# Patient Record
Sex: MALE | HISPANIC OR LATINO | Employment: UNEMPLOYED | URBAN - METROPOLITAN AREA
[De-identification: names, ages, dates, MRNs, and addresses within clinical notes are randomized per-mention and may not be internally consistent; named-entity substitution may affect disease eponyms.]

---

## 2022-04-06 ENCOUNTER — TRANSCRIBE ORDERS (OUTPATIENT)
Dept: CARDIOLOGY | Facility: CLINIC | Age: 47
End: 2022-04-06

## 2022-04-06 ENCOUNTER — TRANSCRIBE ORDERS (OUTPATIENT)
Dept: ADMINISTRATIVE | Facility: HOSPITAL | Age: 47
End: 2022-04-06

## 2022-04-06 DIAGNOSIS — I31.39 PERICARDIAL EFFUSION (NONINFLAMMATORY): Primary | ICD-10-CM

## 2022-04-06 DIAGNOSIS — Z01.810 PRE-OPERATIVE CARDIOVASCULAR EXAMINATION: Primary | ICD-10-CM

## 2022-04-06 DIAGNOSIS — Z13.6 SCREENING FOR ISCHEMIC HEART DISEASE: ICD-10-CM

## 2022-04-06 DIAGNOSIS — Z01.818 OTHER SPECIFIED PRE-OPERATIVE EXAMINATION: Primary | ICD-10-CM

## 2022-04-08 ENCOUNTER — LAB (OUTPATIENT)
Dept: LAB | Facility: HOSPITAL | Age: 47
End: 2022-04-08

## 2022-04-08 DIAGNOSIS — Z01.818 OTHER SPECIFIED PRE-OPERATIVE EXAMINATION: ICD-10-CM

## 2022-04-08 DIAGNOSIS — Z13.6 SCREENING FOR ISCHEMIC HEART DISEASE: ICD-10-CM

## 2022-04-08 DIAGNOSIS — Z01.810 PRE-OPERATIVE CARDIOVASCULAR EXAMINATION: ICD-10-CM

## 2022-04-08 LAB — SARS-COV-2 ORF1AB RESP QL NAA+PROBE: NOT DETECTED

## 2022-04-08 PROCEDURE — 36415 COLL VENOUS BLD VENIPUNCTURE: CPT

## 2022-04-08 PROCEDURE — C9803 HOPD COVID-19 SPEC COLLECT: HCPCS

## 2022-04-08 PROCEDURE — U0004 COV-19 TEST NON-CDC HGH THRU: HCPCS

## 2022-04-08 PROCEDURE — 82172 ASSAY OF APOLIPOPROTEIN: CPT

## 2022-04-10 LAB — APO B SERPL-MCNC: 102 MG/DL

## 2022-04-11 ENCOUNTER — HOSPITAL ENCOUNTER (OUTPATIENT)
Facility: HOSPITAL | Age: 47
Setting detail: HOSPITAL OUTPATIENT SURGERY
Discharge: HOME | End: 2022-04-11
Attending: INTERNAL MEDICINE | Admitting: INTERNAL MEDICINE

## 2022-04-11 ENCOUNTER — APPOINTMENT (OUTPATIENT)
Dept: CARDIOLOGY | Facility: HOSPITAL | Age: 47
End: 2022-04-11

## 2022-04-11 VITALS
BODY MASS INDEX: 32.2 KG/M2 | HEIGHT: 62 IN | OXYGEN SATURATION: 97 % | TEMPERATURE: 99.1 F | SYSTOLIC BLOOD PRESSURE: 137 MMHG | WEIGHT: 175 LBS | RESPIRATION RATE: 16 BRPM | HEART RATE: 76 BPM | DIASTOLIC BLOOD PRESSURE: 84 MMHG

## 2022-04-11 DIAGNOSIS — I31.39 PERICARDIAL EFFUSION (NONINFLAMMATORY): ICD-10-CM

## 2022-04-11 LAB
MAXIMAL PREDICTED HEART RATE: 172 BPM
STRESS TARGET HR: 146 BPM

## 2022-04-11 PROCEDURE — 93321 DOPPLER ECHO F-UP/LMTD STD: CPT

## 2022-04-11 PROCEDURE — S0260 H&P FOR SURGERY: HCPCS | Performed by: INTERNAL MEDICINE

## 2022-04-11 PROCEDURE — 93308 TTE F-UP OR LMTD: CPT

## 2022-04-11 PROCEDURE — 93308 TTE F-UP OR LMTD: CPT | Performed by: INTERNAL MEDICINE

## 2022-04-11 PROCEDURE — 93321 DOPPLER ECHO F-UP/LMTD STD: CPT | Performed by: INTERNAL MEDICINE

## 2022-04-11 RX ORDER — SODIUM CHLORIDE 0.9 % (FLUSH) 0.9 %
10 SYRINGE (ML) INJECTION EVERY 12 HOURS SCHEDULED
Status: DISCONTINUED | OUTPATIENT
Start: 2022-04-11 | End: 2022-04-11 | Stop reason: HOSPADM

## 2022-04-11 RX ORDER — AMLODIPINE BESYLATE 10 MG/1
10 TABLET ORAL DAILY
COMMUNITY
End: 2022-06-05 | Stop reason: HOSPADM

## 2022-04-11 RX ORDER — LOSARTAN POTASSIUM 100 MG/1
100 TABLET ORAL DAILY
COMMUNITY
End: 2022-06-05 | Stop reason: HOSPADM

## 2022-04-11 RX ORDER — SIMVASTATIN 40 MG
40 TABLET ORAL NIGHTLY
COMMUNITY

## 2022-04-11 RX ORDER — SODIUM CHLORIDE 0.9 % (FLUSH) 0.9 %
10 SYRINGE (ML) INJECTION AS NEEDED
Status: DISCONTINUED | OUTPATIENT
Start: 2022-04-11 | End: 2022-04-11 | Stop reason: HOSPADM

## 2022-04-11 RX ORDER — SODIUM CHLORIDE 9 MG/ML
75 INJECTION, SOLUTION INTRAVENOUS CONTINUOUS
Status: DISCONTINUED | OUTPATIENT
Start: 2022-04-11 | End: 2022-04-11 | Stop reason: HOSPADM

## 2022-04-11 RX ADMIN — SODIUM CHLORIDE 75 ML/HR: 9 INJECTION, SOLUTION INTRAVENOUS at 11:40

## 2022-04-11 NOTE — NURSING NOTE
Procedure cancelled d/t echo results. Pericardiocentesis not needed at this time. Pt and son informed. IV d/c and pt discharged home.

## 2022-04-11 NOTE — H&P
He was referred for pericardiocentesis we did a limited echo today and really his effusion is small I reviewed it with Dr. Cedillo was given a hold off on a pericardiocentesis and he will follow up with Dr. Darnell have a heart

## 2022-04-26 ENCOUNTER — OFFICE VISIT (OUTPATIENT)
Dept: CARDIAC SURGERY | Facility: CLINIC | Age: 47
End: 2022-04-26

## 2022-04-26 VITALS
TEMPERATURE: 98.7 F | OXYGEN SATURATION: 98 % | BODY MASS INDEX: 32.02 KG/M2 | WEIGHT: 174 LBS | HEART RATE: 76 BPM | SYSTOLIC BLOOD PRESSURE: 138 MMHG | HEIGHT: 62 IN | DIASTOLIC BLOOD PRESSURE: 77 MMHG

## 2022-04-26 DIAGNOSIS — Z86.11 HISTORY OF TUBERCULOSIS: ICD-10-CM

## 2022-04-26 DIAGNOSIS — K08.89 TOOTH PAIN: ICD-10-CM

## 2022-04-26 DIAGNOSIS — I31.39 PERICARDIAL EFFUSION (NONINFLAMMATORY): ICD-10-CM

## 2022-04-26 DIAGNOSIS — I35.0 SEVERE AORTIC VALVE STENOSIS: ICD-10-CM

## 2022-04-26 DIAGNOSIS — K05.10 GUM INFLAMMATION: Primary | ICD-10-CM

## 2022-04-26 PROCEDURE — 99204 OFFICE O/P NEW MOD 45 MIN: CPT | Performed by: THORACIC SURGERY (CARDIOTHORACIC VASCULAR SURGERY)

## 2022-04-26 RX ORDER — AMOXICILLIN 500 MG/1
1000 CAPSULE ORAL 3 TIMES DAILY
Qty: 30 CAPSULE | Refills: 0 | Status: ON HOLD | OUTPATIENT
Start: 2022-04-26 | End: 2022-05-30

## 2022-04-27 PROBLEM — K08.89 TOOTH PAIN: Status: ACTIVE | Noted: 2022-04-27

## 2022-04-27 PROBLEM — I35.0 SEVERE AORTIC VALVE STENOSIS: Status: ACTIVE | Noted: 2022-04-27

## 2022-04-27 PROBLEM — Z86.11 HISTORY OF TUBERCULOSIS: Status: ACTIVE | Noted: 2022-04-27

## 2022-04-27 NOTE — PROGRESS NOTES
4/27/2022    Seen on 4/26/2022    Chief Complaint     Evaluation of aortic stenosis    History of Present Illness:       Dear Refugio Jackson MD and Colleagues,  It was nice to see Jorge Luis Shamika in consultation at your request. He is a 46 y.o. male with a history of tuberculosis in the distant past for what he was treated for 1 year with 2 agents, hypercholesterolemia, hypertension who was seen in follow-up for his aortic stenosis which was moderate in the past.  He is asymptomatic.  Denies chest pain, dyspnea orthopnea.  He works as a .  He is able to develop his usual activity.  He denies any cough, hemoptysis night sweats or inspiratory chest pain.  He had a 2D echo cardiogram that showed a degree of pericardial effusion and given his previous history he was scheduled for the pericardiocardiocentesis.  His aortic stenosis was labeled as severe which is progressed from before.  He denies scarlet or rheumatic fever in the past.  Denies IV drug use.  He had a repeat echocardiogram before the pericardiocentesis and there was no much fluid so the procedure was not performed.  His echocardiogram showed a peak velocity of 4.2 m/s, peak gradient 73 mmHg, mean gradient of 44 mmHg and an aortic valve area 0.8 cm² and ejection fraction was normal.  The aortic valve was bicuspid and had mild regurgitation.  He is here for a surgical opinion    Patient Active Problem List   Diagnosis   • Pericardial effusion (noninflammatory)   • Tooth pain   • Severe aortic valve stenosis   • History of tuberculosis       Past Medical History:   Diagnosis Date   • Cardiac murmur    • Hypercholesterolemia    • Hypertension    • Pericardial effusion    • Topical med dermatitis        Past Surgical History:   Procedure Laterality Date   • SHOULDER CLOSED REDUCTION Right        Allergies   Allergen Reactions   • Lisinopril Cough         Current Outpatient Medications:   •  amLODIPine (NORVASC) 10 MG tablet, Take 10 mg by  mouth Daily., Disp: , Rfl:   •  LOSARTAN POTASSIUM PO, Take 100 mg by mouth Daily., Disp: , Rfl:   •  simvastatin (ZOCOR) 40 MG tablet, Take 40 mg by mouth Every Night., Disp: , Rfl:   •  amoxicillin (AMOXIL) 500 MG capsule, Take 2 capsules by mouth 3 (Three) Times a Day., Disp: 30 capsule, Rfl: 0    Social History     Socioeconomic History   • Marital status:    Tobacco Use   • Smoking status: Never Smoker   • Smokeless tobacco: Never Used   Vaping Use   • Vaping Use: Never used   Substance and Sexual Activity   • Alcohol use: Yes     Comment: WEEKENDS   • Drug use: Never   • Sexual activity: Defer       History reviewed. No pertinent family history.    Review of Systems  Noncontributory    Physical Exam:    Vital Signs:  Weight: 78.9 kg (174 lb)   Body mass index is 31.83 kg/m².  Temp: 98.7 °F (37.1 °C)   Heart Rate: 76   BP: 138/77     Constitutional:       Appearance: Well-developed.   Eyes:      Conjunctiva/sclera: Conjunctivae normal.      Pupils: Pupils are equal, round, and reactive to light.   HENT:      Head: Normocephalic and atraumatic.      Nose: Nose normal.   Neck:      Thyroid: No thyromegaly.      Vascular: No JVD.      Lymphadenopathy: No cervical adenopathy.   Pulmonary:      Effort: Pulmonary effort is normal.      Breath sounds: Normal breath sounds. No rales.   Cardiovascular:      PMI at left midclavicular line. Normal rate. Regular rhythm.      Murmurs: There is a harsh midsystolic murmur at the URSB, radiating to the neck.      No gallop.   Pulses:     Intact distal pulses. No decreased pulses.   Edema:     Peripheral edema absent.   Abdominal:      General: Bowel sounds are normal. There is no distension.      Palpations: Abdomen is soft. There is no abdominal mass.      Tenderness: There is no abdominal tenderness.   Musculoskeletal: Normal range of motion.         General: No tenderness or deformity.      Cervical back: Normal range of motion and neck supple. Skin:     General:  Skin is warm and dry.      Findings: No erythema or rash.   Neurological:      Mental Status: Alert and oriented to person, place, and time.      Deep Tendon Reflexes: Reflexes are normal and symmetric.   Psychiatric:         Behavior: Behavior normal.          Assessment:     Problems Addressed this Visit        Cardiac and Vasculature    Pericardial effusion (noninflammatory)    Severe aortic valve stenosis       ENT    Tooth pain       Other    History of tuberculosis      Other Visit Diagnoses     Gum inflammation    -  Primary      Diagnoses       Codes Comments    Gum inflammation    -  Primary ICD-10-CM: K05.10  ICD-9-CM: 523.10     Pericardial effusion (noninflammatory)     ICD-10-CM: I31.3  ICD-9-CM: 423.9     Severe aortic valve stenosis     ICD-10-CM: I35.0  ICD-9-CM: 424.1     Tooth pain     ICD-10-CM: K08.89  ICD-9-CM: 525.9     History of tuberculosis     ICD-10-CM: Z86.11  ICD-9-CM: V12.01             Assessment/recommendation:     1. Severe aortic stenosis without symptoms at the time.  Seemingly he has no aortic root enlargement per echocardiogram.  His gradients have been progressive.  I recommend 6-month follow-up with another echocardiogram and most likely at some point soon he will require aortic valve replacement, either a biologic prosthesis versus a Ross procedure.  We will see him in 6 months and follow-up  2. He has documented pericardial effusion but there is no clear whether that is also inflammation changes or previous pericarditis.  He has a history of tuberculosis and is possible that there is some chronic pericarditis.  There is no evidence of heart restriction at this point.  I recommend a chest CT to better evaluate the presence of a peel.  3. Hypertension, well controlled.  Continue same regimen    Thank you for allowing me to participate in his care.    Regards,    David Fallon M.D.  I spent over 45 minutes in reviewing the records and examining the patient, reviewing and  interpreting the echocardiograms myself and discussing the findings and options with the patient, delineating a plan of work-up of chronic pericarditis with the indication of the chest CT scan, discussing the plan of an operation in the near future, discussing the case with the care team and documenting in the electronic record

## 2022-05-09 ENCOUNTER — TELEPHONE (OUTPATIENT)
Dept: CARDIAC SURGERY | Facility: CLINIC | Age: 47
End: 2022-05-09

## 2022-05-09 NOTE — TELEPHONE ENCOUNTER
Patient family called to update the provider on the fact that the patient is getting his teeth pulled on Thursday .     The more concern they have is that they know in the works trying to get the patient in for a open heart surgery . The patient stands to lose his medicaid at the end of the month . They were wondering if there was anything the provider can do to help get a additional month of medicaid    provider is David Fallon MD    Best phone number is to reach the wife   [phone]

## 2022-05-10 DIAGNOSIS — I35.0 AORTIC STENOSIS, SEVERE: Primary | ICD-10-CM

## 2022-05-16 ENCOUNTER — TRANSCRIBE ORDERS (OUTPATIENT)
Dept: CARDIOLOGY | Facility: CLINIC | Age: 47
End: 2022-05-16

## 2022-05-16 DIAGNOSIS — Z01.818 OTHER SPECIFIED PRE-OPERATIVE EXAMINATION: ICD-10-CM

## 2022-05-16 DIAGNOSIS — Z01.810 PREPROCEDURAL CARDIOVASCULAR EXAMINATION: ICD-10-CM

## 2022-05-16 DIAGNOSIS — Z13.6 SCREENING FOR CARDIOVASCULAR CONDITION: Primary | ICD-10-CM

## 2022-05-17 ENCOUNTER — TELEPHONE (OUTPATIENT)
Dept: CARDIOLOGY | Facility: CLINIC | Age: 47
End: 2022-05-17

## 2022-05-17 ENCOUNTER — LAB (OUTPATIENT)
Dept: LAB | Facility: HOSPITAL | Age: 47
End: 2022-05-17

## 2022-05-17 ENCOUNTER — APPOINTMENT (OUTPATIENT)
Dept: LAB | Facility: HOSPITAL | Age: 47
End: 2022-05-17

## 2022-05-17 DIAGNOSIS — Z01.810 PREPROCEDURAL CARDIOVASCULAR EXAMINATION: ICD-10-CM

## 2022-05-17 DIAGNOSIS — Z01.818 OTHER SPECIFIED PRE-OPERATIVE EXAMINATION: ICD-10-CM

## 2022-05-17 DIAGNOSIS — Z13.6 SCREENING FOR CARDIOVASCULAR CONDITION: ICD-10-CM

## 2022-05-17 LAB
ANION GAP SERPL CALCULATED.3IONS-SCNC: 12.3 MMOL/L (ref 5–15)
BASOPHILS # BLD AUTO: 0.06 10*3/MM3 (ref 0–0.2)
BASOPHILS NFR BLD AUTO: 0.8 % (ref 0–1.5)
BUN SERPL-MCNC: 10 MG/DL (ref 6–20)
BUN/CREAT SERPL: 12.3 (ref 7–25)
CALCIUM SPEC-SCNC: 9.7 MG/DL (ref 8.6–10.5)
CHLORIDE SERPL-SCNC: 103 MMOL/L (ref 98–107)
CO2 SERPL-SCNC: 23.7 MMOL/L (ref 22–29)
CREAT SERPL-MCNC: 0.81 MG/DL (ref 0.76–1.27)
DEPRECATED RDW RBC AUTO: 40.9 FL (ref 37–54)
EGFRCR SERPLBLD CKD-EPI 2021: 108.8 ML/MIN/1.73
EOSINOPHIL # BLD AUTO: 0.17 10*3/MM3 (ref 0–0.4)
EOSINOPHIL NFR BLD AUTO: 2.4 % (ref 0.3–6.2)
ERYTHROCYTE [DISTWIDTH] IN BLOOD BY AUTOMATED COUNT: 12.1 % (ref 12.3–15.4)
GLUCOSE SERPL-MCNC: 100 MG/DL (ref 65–99)
HCT VFR BLD AUTO: 43.5 % (ref 37.5–51)
HGB BLD-MCNC: 15.1 G/DL (ref 13–17.7)
IMM GRANULOCYTES # BLD AUTO: 0.02 10*3/MM3 (ref 0–0.05)
IMM GRANULOCYTES NFR BLD AUTO: 0.3 % (ref 0–0.5)
LYMPHOCYTES # BLD AUTO: 1.3 10*3/MM3 (ref 0.7–3.1)
LYMPHOCYTES NFR BLD AUTO: 18.4 % (ref 19.6–45.3)
MCH RBC QN AUTO: 32 PG (ref 26.6–33)
MCHC RBC AUTO-ENTMCNC: 34.7 G/DL (ref 31.5–35.7)
MCV RBC AUTO: 92.2 FL (ref 79–97)
MONOCYTES # BLD AUTO: 0.34 10*3/MM3 (ref 0.1–0.9)
MONOCYTES NFR BLD AUTO: 4.8 % (ref 5–12)
NEUTROPHILS NFR BLD AUTO: 5.18 10*3/MM3 (ref 1.7–7)
NEUTROPHILS NFR BLD AUTO: 73.3 % (ref 42.7–76)
NRBC BLD AUTO-RTO: 0 /100 WBC (ref 0–0.2)
PLATELET # BLD AUTO: 342 10*3/MM3 (ref 140–450)
PMV BLD AUTO: 9.5 FL (ref 6–12)
POTASSIUM SERPL-SCNC: 4.6 MMOL/L (ref 3.5–5.2)
RBC # BLD AUTO: 4.72 10*6/MM3 (ref 4.14–5.8)
SARS-COV-2 RNA RESP QL NAA+PROBE: DETECTED
SODIUM SERPL-SCNC: 139 MMOL/L (ref 136–145)
WBC NRBC COR # BLD: 7.07 10*3/MM3 (ref 3.4–10.8)

## 2022-05-17 PROCEDURE — 80048 BASIC METABOLIC PNL TOTAL CA: CPT

## 2022-05-17 PROCEDURE — 85025 COMPLETE CBC W/AUTO DIFF WBC: CPT

## 2022-05-17 PROCEDURE — C9803 HOPD COVID-19 SPEC COLLECT: HCPCS

## 2022-05-17 PROCEDURE — U0003 INFECTIOUS AGENT DETECTION BY NUCLEIC ACID (DNA OR RNA); SEVERE ACUTE RESPIRATORY SYNDROME CORONAVIRUS 2 (SARS-COV-2) (CORONAVIRUS DISEASE [COVID-19]), AMPLIFIED PROBE TECHNIQUE, MAKING USE OF HIGH THROUGHPUT TECHNOLOGIES AS DESCRIBED BY CMS-2020-01-R: HCPCS

## 2022-05-17 PROCEDURE — 36415 COLL VENOUS BLD VENIPUNCTURE: CPT

## 2022-05-17 NOTE — TELEPHONE ENCOUNTER
Scheduling,    Pt has tested positive for COVID 19. Per Dr. Corcoran his cath procedure needs to be rescheduled for after his quarantine period. I called the patient with an  and he left a detailed message on the patient's voicemail letting him know the situation. Could you get his procedure rescheduled?    Thank you,    Mary Fraser RN  Triage [facility]

## 2022-05-17 NOTE — TELEPHONE ENCOUNTER
Dr. Corcoran,    Lab called to report pt is positive for COVID. Do I need to let him know or work on changing his cath date?    Thank you,    Mary Fraser, RN  Triage [facility]

## 2022-05-18 ENCOUNTER — TELEPHONE (OUTPATIENT)
Dept: CARDIAC SURGERY | Facility: CLINIC | Age: 47
End: 2022-05-18

## 2022-05-18 NOTE — TELEPHONE ENCOUNTER
Spoke to pt about outpatient covid  Test from 5/17. Negative result scanned in to chart. Message sent to Dr. Corcoran per Vasyl Seay's request to inquire about next steps for heart cath.

## 2022-05-19 ENCOUNTER — HOSPITAL ENCOUNTER (OUTPATIENT)
Facility: HOSPITAL | Age: 47
Setting detail: HOSPITAL OUTPATIENT SURGERY
Discharge: HOME | End: 2022-05-19
Attending: INTERNAL MEDICINE | Admitting: INTERNAL MEDICINE

## 2022-05-19 ENCOUNTER — APPOINTMENT (OUTPATIENT)
Dept: CT IMAGING | Facility: HOSPITAL | Age: 47
End: 2022-05-19

## 2022-05-19 VITALS
SYSTOLIC BLOOD PRESSURE: 122 MMHG | RESPIRATION RATE: 16 BRPM | TEMPERATURE: 98.7 F | OXYGEN SATURATION: 97 % | HEIGHT: 60 IN | DIASTOLIC BLOOD PRESSURE: 70 MMHG | WEIGHT: 168 LBS | HEART RATE: 68 BPM | BODY MASS INDEX: 32.98 KG/M2

## 2022-05-19 DIAGNOSIS — I35.0 AORTIC STENOSIS, SEVERE: ICD-10-CM

## 2022-05-19 PROCEDURE — C1769 GUIDE WIRE: HCPCS | Performed by: INTERNAL MEDICINE

## 2022-05-19 PROCEDURE — C1894 INTRO/SHEATH, NON-LASER: HCPCS | Performed by: INTERNAL MEDICINE

## 2022-05-19 PROCEDURE — 93454 CORONARY ARTERY ANGIO S&I: CPT | Performed by: INTERNAL MEDICINE

## 2022-05-19 PROCEDURE — 25010000002 MIDAZOLAM PER 1 MG: Performed by: INTERNAL MEDICINE

## 2022-05-19 PROCEDURE — 25010000002 HEPARIN (PORCINE) PER 1000 UNITS: Performed by: INTERNAL MEDICINE

## 2022-05-19 PROCEDURE — 0 IOPAMIDOL PER 1 ML: Performed by: INTERNAL MEDICINE

## 2022-05-19 PROCEDURE — 71250 CT THORAX DX C-: CPT

## 2022-05-19 PROCEDURE — 25010000002 FENTANYL CITRATE (PF) 50 MCG/ML SOLUTION: Performed by: INTERNAL MEDICINE

## 2022-05-19 RX ORDER — ACETAMINOPHEN 325 MG/1
650 TABLET ORAL EVERY 4 HOURS PRN
Status: DISCONTINUED | OUTPATIENT
Start: 2022-05-19 | End: 2022-05-19 | Stop reason: HOSPADM

## 2022-05-19 RX ORDER — FENTANYL CITRATE 50 UG/ML
INJECTION, SOLUTION INTRAMUSCULAR; INTRAVENOUS AS NEEDED
Status: DISCONTINUED | OUTPATIENT
Start: 2022-05-19 | End: 2022-05-19 | Stop reason: HOSPADM

## 2022-05-19 RX ORDER — SODIUM CHLORIDE 0.9 % (FLUSH) 0.9 %
10 SYRINGE (ML) INJECTION AS NEEDED
Status: DISCONTINUED | OUTPATIENT
Start: 2022-05-19 | End: 2022-05-19 | Stop reason: HOSPADM

## 2022-05-19 RX ORDER — LIDOCAINE HYDROCHLORIDE 20 MG/ML
INJECTION, SOLUTION INFILTRATION; PERINEURAL AS NEEDED
Status: DISCONTINUED | OUTPATIENT
Start: 2022-05-19 | End: 2022-05-19 | Stop reason: HOSPADM

## 2022-05-19 RX ORDER — SODIUM CHLORIDE 9 MG/ML
75 INJECTION, SOLUTION INTRAVENOUS CONTINUOUS
Status: DISCONTINUED | OUTPATIENT
Start: 2022-05-19 | End: 2022-05-19 | Stop reason: HOSPADM

## 2022-05-19 RX ORDER — SODIUM CHLORIDE 0.9 % (FLUSH) 0.9 %
10 SYRINGE (ML) INJECTION EVERY 12 HOURS SCHEDULED
Status: DISCONTINUED | OUTPATIENT
Start: 2022-05-19 | End: 2022-05-19 | Stop reason: HOSPADM

## 2022-05-19 RX ORDER — MIDAZOLAM HYDROCHLORIDE 1 MG/ML
INJECTION INTRAMUSCULAR; INTRAVENOUS AS NEEDED
Status: DISCONTINUED | OUTPATIENT
Start: 2022-05-19 | End: 2022-05-19 | Stop reason: HOSPADM

## 2022-05-19 RX ADMIN — SODIUM CHLORIDE 75 ML/HR: 9 INJECTION, SOLUTION INTRAVENOUS at 11:38

## 2022-05-19 NOTE — DISCHARGE INSTRUCTIONS
[facility]  [address] [place] [address]    Coronary Angiogram (Radial/Ulnar Approach) After Care    Refer to this sheet in the next few weeks. These instructions provide you with information on caring for yourself after your procedure. Your caregiver may also give you more specific instructions. Your treatment has been planned according to current medical practices, but problems sometimes occur. Call your caregiver if you have any problems or questions after your procedure.    Home Care Instructions:  You may shower the day after the procedure. Remove the bandage (dressing) and gently wash the site with plain soap and water. Gently pat the site dry. You may apply a band aid daily for 2 days if desired.    Do not apply powder or lotion to the site.  Do not submerge the affected site in water for 3 to 5 days or until the site is completely healed.   Do not lift, push or pull anything over 5 pounds for 5 days after your procedure or as directed by your physician.  As a reference, a gallon of milk weighs 8 pounds.   Inspect the site at least twice daily. You may notice some bruising at the site and it may be tender for 1 to 2 weeks.     Increase your fluid intake for the next 2 days.    Keep arm elevated for 24 hours. For the remainder of the day, keep your arm in “Pledge of Allegiance” position when up and about.     You may drive 24 hours after the procedure unless otherwise instructed by your caregiver.  Do not operate machinery or power tools for 24 hours.  A responsible adult should be with you for the first 24 hours after you arrive home. Do not make any important legal decisions or sign legal papers for 24 hours.  Do not drink alcohol for 24 hours.    Metformin or any medications containing Metformin should not be taken for 48 hours after your procedure.      Call Your Doctor if:   You have unusual pain at the radial/ulnar (wrist) site.  You have redness, warmth, swelling, or pain at the  radial/ulnar (wrist) site.  You have drainage (other than a small amount of blood on the dressing).  `You have chills or a fever > 101.  Your arm becomes pale or dark, cool, tingly, or numb.  You develop chest pain, shortness of breath, feel faint or pass out.    You have heavy bleeding from the site, hold pressure on the site for 20 minutes.  If the bleeding stops, apply a fresh bandage and call your cardiologist.  However, if you        continue to have bleeding, call 911 and continue to apply pressure to the site.   You have any symptoms of a stroke.  Remember BE FAST  B-balance. Sudden trouble walking or loss of balance.  E-eyes.  Sudden changes in how you see or a sudden onset of a very bad headache.   F-face. Sudden weakness or loss of feeling of the face or facial droop on one side.   A-arms Sudden weakness or numbness in one arm.  One arm drifts down if they are both held out in front of you. This happens suddenly and usually on one side of the body.   S-speech.  Sudden trouble speaking, slurred speech or trouble understanding what are saying.   T-time  Time to call emergency services.  Write down the symptoms and the time they started.

## 2022-05-20 ENCOUNTER — PREP FOR SURGERY (OUTPATIENT)
Dept: OTHER | Facility: HOSPITAL | Age: 47
End: 2022-05-20

## 2022-05-20 ENCOUNTER — HOSPITAL ENCOUNTER (OUTPATIENT)
Facility: HOSPITAL | Age: 47
Setting detail: SURGERY ADMIT
End: 2022-05-20

## 2022-05-20 ENCOUNTER — TELEPHONE (OUTPATIENT)
Dept: CARDIAC SURGERY | Facility: CLINIC | Age: 47
End: 2022-05-20

## 2022-05-20 DIAGNOSIS — I35.0 AORTIC STENOSIS, SEVERE: Primary | ICD-10-CM

## 2022-05-20 RX ORDER — CHLORHEXIDINE GLUCONATE 0.12 MG/ML
15 RINSE ORAL ONCE
Status: CANCELLED | OUTPATIENT
Start: 2022-05-20 | End: 2022-05-20

## 2022-05-20 RX ORDER — CHLORHEXIDINE GLUCONATE 0.12 MG/ML
15 RINSE ORAL EVERY 12 HOURS
Status: CANCELLED | OUTPATIENT
Start: 2022-05-20 | End: 2022-05-21

## 2022-05-20 RX ORDER — CHLORHEXIDINE GLUCONATE 500 MG/1
1 CLOTH TOPICAL EVERY 12 HOURS PRN
Status: CANCELLED | OUTPATIENT
Start: 2022-05-20

## 2022-05-20 RX ORDER — CEFAZOLIN SODIUM 2 G/100ML
2 INJECTION, SOLUTION INTRAVENOUS
Status: CANCELLED | OUTPATIENT
Start: 2022-05-21 | End: 2022-05-22

## 2022-05-20 NOTE — TELEPHONE ENCOUNTER
Called to speak to patient to give him this PAT and surgery times.  Diana spoke to them and explained his PAT is on 5- at 0700. Surgery is 5-31-+2022 at 0730 with an 0500 arrival time.  They expressed a verbal understainding to her of this information.

## 2022-05-25 ENCOUNTER — TELEPHONE (OUTPATIENT)
Dept: CARDIAC SURGERY | Facility: CLINIC | Age: 47
End: 2022-05-25

## 2022-05-25 NOTE — TELEPHONE ENCOUNTER
Caller: Jorge Luis Godinez    Relationship: SON    Best call back number: [phone]    What is the best time to reach you: ANY    Who are you requesting to speak with (clinical staff, provider,  specific staff member): MARIA G FISH    What was the call regarding: CALLING TO SEE IF SHE HAD RECEIVED [facility] PAPERWORK- PT SON (SAME NAME AS PT) STATED HE RECEIVED AN ERROR WHEN HE TRIED TO EMAIL MARIA G.     I DID ALSO PROVIDE PT SON WITH OFFICE FAX NUMBER.     Do you require a callback: YES

## 2022-05-27 ENCOUNTER — APPOINTMENT (OUTPATIENT)
Dept: CARDIOLOGY | Facility: HOSPITAL | Age: 47
End: 2022-05-27

## 2022-05-27 ENCOUNTER — APPOINTMENT (OUTPATIENT)
Dept: PREADMISSION TESTING | Facility: HOSPITAL | Age: 47
End: 2022-05-27

## 2022-05-27 ENCOUNTER — TELEPHONE (OUTPATIENT)
Dept: CARDIAC SURGERY | Facility: CLINIC | Age: 47
End: 2022-05-27

## 2022-05-27 NOTE — TELEPHONE ENCOUNTER
Spoke to son with new plans for his fathers admission and surgery. Patient will be admitted on 5-. I have spoken to Raven at bed board and explained situation with insurance issues and possibility of using [facility]. Son is to sign paper DSH form with Tomás hoffman at [facility]. Surgery is scheduled for 5- with Dr. Greenberg. This has all been cleared and approved with Smiley and Mariajose. The son expressed a verbal understanding of all of this.  I gave him [phone] to call on Monday if he had not heard anything from them by around 0900.

## 2022-05-30 ENCOUNTER — ANESTHESIA EVENT (OUTPATIENT)
Dept: PERIOP | Facility: HOSPITAL | Age: 47
End: 2022-05-30

## 2022-05-30 ENCOUNTER — APPOINTMENT (OUTPATIENT)
Dept: GENERAL RADIOLOGY | Facility: HOSPITAL | Age: 47
End: 2022-05-30

## 2022-05-30 ENCOUNTER — PREP FOR SURGERY (OUTPATIENT)
Dept: OTHER | Facility: HOSPITAL | Age: 47
End: 2022-05-30

## 2022-05-30 ENCOUNTER — HOSPITAL ENCOUNTER (INPATIENT)
Facility: HOSPITAL | Age: 47
LOS: 6 days | Discharge: HOME HEALTH | End: 2022-06-05

## 2022-05-30 DIAGNOSIS — Z95.2 S/P AVR (AORTIC VALVE REPLACEMENT): Primary | ICD-10-CM

## 2022-05-30 DIAGNOSIS — I35.0 AORTIC STENOSIS, SEVERE: ICD-10-CM

## 2022-05-30 LAB
ABO GROUP BLD: NORMAL
ABO GROUP BLD: NORMAL
ALBUMIN SERPL-MCNC: 5 G/DL (ref 3.5–5.2)
ALBUMIN/GLOB SERPL: 1.7 G/DL
ALP SERPL-CCNC: 91 U/L (ref 39–117)
ALT SERPL W P-5'-P-CCNC: 39 U/L (ref 1–41)
ANION GAP SERPL CALCULATED.3IONS-SCNC: 13 MMOL/L (ref 5–15)
APTT PPP: 30.1 SECONDS (ref 22.7–35.4)
ARTERIAL PATENCY WRIST A: NORMAL
AST SERPL-CCNC: 20 U/L (ref 1–40)
ATMOSPHERIC PRESS: 750.4 MMHG
BASE EXCESS BLDA CALC-SCNC: 0.1 MMOL/L (ref 0–2)
BASOPHILS # BLD AUTO: 0.1 10*3/MM3 (ref 0–0.2)
BASOPHILS NFR BLD AUTO: 1.4 % (ref 0–1.5)
BDY SITE: NORMAL
BILIRUB SERPL-MCNC: 0.3 MG/DL (ref 0–1.2)
BILIRUB UR QL STRIP: NEGATIVE
BLD GP AB SCN SERPL QL: NEGATIVE
BUN SERPL-MCNC: 12 MG/DL (ref 6–20)
BUN/CREAT SERPL: 13.5 (ref 7–25)
CALCIUM SPEC-SCNC: 9.1 MG/DL (ref 8.6–10.5)
CHLORIDE SERPL-SCNC: 103 MMOL/L (ref 98–107)
CHOLEST SERPL-MCNC: 151 MG/DL (ref 0–200)
CLARITY UR: CLEAR
CLOSE TME COLL+ADP + EPINEP PNL BLD: 89 % (ref 86–100)
CO2 SERPL-SCNC: 25 MMOL/L (ref 22–29)
COLOR UR: YELLOW
CREAT SERPL-MCNC: 0.89 MG/DL (ref 0.76–1.27)
DEPRECATED RDW RBC AUTO: 41.7 FL (ref 37–54)
EGFRCR SERPLBLD CKD-EPI 2021: 105.7 ML/MIN/1.73
EOSINOPHIL # BLD AUTO: 0.32 10*3/MM3 (ref 0–0.4)
EOSINOPHIL NFR BLD AUTO: 4.4 % (ref 0.3–6.2)
ERYTHROCYTE [DISTWIDTH] IN BLOOD BY AUTOMATED COUNT: 12.3 % (ref 12.3–15.4)
GLOBULIN UR ELPH-MCNC: 2.9 GM/DL
GLUCOSE BLDC GLUCOMTR-MCNC: 90 MG/DL (ref 70–130)
GLUCOSE SERPL-MCNC: 89 MG/DL (ref 65–99)
GLUCOSE UR STRIP-MCNC: NEGATIVE MG/DL
HBA1C MFR BLD: 6.1 % (ref 4.8–5.6)
HCO3 BLDA-SCNC: 24.1 MMOL/L (ref 22–28)
HCT VFR BLD AUTO: 45.7 % (ref 37.5–51)
HDLC SERPL-MCNC: 30 MG/DL (ref 40–60)
HGB BLD-MCNC: 15.9 G/DL (ref 13–17.7)
HGB UR QL STRIP.AUTO: NEGATIVE
IMM GRANULOCYTES # BLD AUTO: 0.01 10*3/MM3 (ref 0–0.05)
IMM GRANULOCYTES NFR BLD AUTO: 0.1 % (ref 0–0.5)
INR PPP: 1.07 (ref 0.9–1.1)
KETONES UR QL STRIP: NEGATIVE
LDLC SERPL CALC-MCNC: 61 MG/DL (ref 0–100)
LDLC/HDLC SERPL: 1.45 {RATIO}
LEUKOCYTE ESTERASE UR QL STRIP.AUTO: NEGATIVE
LYMPHOCYTES # BLD AUTO: 1.28 10*3/MM3 (ref 0.7–3.1)
LYMPHOCYTES NFR BLD AUTO: 17.6 % (ref 19.6–45.3)
MAGNESIUM SERPL-MCNC: 2.1 MG/DL (ref 1.6–2.6)
MCH RBC QN AUTO: 32.1 PG (ref 26.6–33)
MCHC RBC AUTO-ENTMCNC: 34.8 G/DL (ref 31.5–35.7)
MCV RBC AUTO: 92.3 FL (ref 79–97)
MODALITY: NORMAL
MONOCYTES # BLD AUTO: 0.6 10*3/MM3 (ref 0.1–0.9)
MONOCYTES NFR BLD AUTO: 8.3 % (ref 5–12)
NEUTROPHILS NFR BLD AUTO: 4.96 10*3/MM3 (ref 1.7–7)
NEUTROPHILS NFR BLD AUTO: 68.2 % (ref 42.7–76)
NITRITE UR QL STRIP: NEGATIVE
NRBC BLD AUTO-RTO: 0 /100 WBC (ref 0–0.2)
NT-PROBNP SERPL-MCNC: 34 PG/ML (ref 0–450)
PCO2 BLDA: 36.5 MM HG (ref 35–45)
PH BLDA: 7.43 PH UNITS (ref 7.35–7.45)
PH UR STRIP.AUTO: 7 [PH] (ref 5–8)
PLATELET # BLD AUTO: 343 10*3/MM3 (ref 140–450)
PMV BLD AUTO: 9.6 FL (ref 6–12)
PO2 BLDA: 88.3 MM HG (ref 80–100)
POTASSIUM SERPL-SCNC: 3.8 MMOL/L (ref 3.5–5.2)
PROT SERPL-MCNC: 7.9 G/DL (ref 6–8.5)
PROT UR QL STRIP: NEGATIVE
PROTHROMBIN TIME: 13.8 SECONDS (ref 11.7–14.2)
RBC # BLD AUTO: 4.95 10*6/MM3 (ref 4.14–5.8)
RH BLD: POSITIVE
RH BLD: POSITIVE
SAO2 % BLDCOA: 97.1 % (ref 92–99)
SARS-COV-2 ORF1AB RESP QL NAA+PROBE: NOT DETECTED
SODIUM SERPL-SCNC: 141 MMOL/L (ref 136–145)
SP GR UR STRIP: 1.02 (ref 1–1.03)
T&S EXPIRATION DATE: NORMAL
TOTAL RATE: 18 BREATHS/MINUTE
TRIGL SERPL-MCNC: 388 MG/DL (ref 0–150)
UROBILINOGEN UR QL STRIP: NORMAL
VLDLC SERPL-MCNC: 60 MG/DL (ref 5–40)
WBC NRBC COR # BLD: 7.27 10*3/MM3 (ref 3.4–10.8)

## 2022-05-30 PROCEDURE — 87086 URINE CULTURE/COLONY COUNT: CPT | Performed by: NURSE PRACTITIONER

## 2022-05-30 PROCEDURE — 80053 COMPREHEN METABOLIC PANEL: CPT | Performed by: NURSE PRACTITIONER

## 2022-05-30 PROCEDURE — 86901 BLOOD TYPING SEROLOGIC RH(D): CPT

## 2022-05-30 PROCEDURE — 81003 URINALYSIS AUTO W/O SCOPE: CPT | Performed by: NURSE PRACTITIONER

## 2022-05-30 PROCEDURE — 85730 THROMBOPLASTIN TIME PARTIAL: CPT | Performed by: NURSE PRACTITIONER

## 2022-05-30 PROCEDURE — 94799 UNLISTED PULMONARY SVC/PX: CPT

## 2022-05-30 PROCEDURE — 93005 ELECTROCARDIOGRAM TRACING: CPT

## 2022-05-30 PROCEDURE — 36600 WITHDRAWAL OF ARTERIAL BLOOD: CPT

## 2022-05-30 PROCEDURE — 85610 PROTHROMBIN TIME: CPT | Performed by: NURSE PRACTITIONER

## 2022-05-30 PROCEDURE — 86900 BLOOD TYPING SEROLOGIC ABO: CPT | Performed by: NURSE PRACTITIONER

## 2022-05-30 PROCEDURE — 71046 X-RAY EXAM CHEST 2 VIEWS: CPT

## 2022-05-30 PROCEDURE — 93010 ELECTROCARDIOGRAM REPORT: CPT | Performed by: INTERNAL MEDICINE

## 2022-05-30 PROCEDURE — 82803 BLOOD GASES ANY COMBINATION: CPT

## 2022-05-30 PROCEDURE — 86900 BLOOD TYPING SEROLOGIC ABO: CPT

## 2022-05-30 PROCEDURE — 83880 ASSAY OF NATRIURETIC PEPTIDE: CPT | Performed by: NURSE PRACTITIONER

## 2022-05-30 PROCEDURE — 82962 GLUCOSE BLOOD TEST: CPT

## 2022-05-30 PROCEDURE — 83735 ASSAY OF MAGNESIUM: CPT | Performed by: NURSE PRACTITIONER

## 2022-05-30 PROCEDURE — U0004 COV-19 TEST NON-CDC HGH THRU: HCPCS | Performed by: NURSE PRACTITIONER

## 2022-05-30 PROCEDURE — 85025 COMPLETE CBC W/AUTO DIFF WBC: CPT | Performed by: NURSE PRACTITIONER

## 2022-05-30 PROCEDURE — 83036 HEMOGLOBIN GLYCOSYLATED A1C: CPT | Performed by: NURSE PRACTITIONER

## 2022-05-30 PROCEDURE — 86923 COMPATIBILITY TEST ELECTRIC: CPT

## 2022-05-30 PROCEDURE — 86901 BLOOD TYPING SEROLOGIC RH(D): CPT | Performed by: NURSE PRACTITIONER

## 2022-05-30 PROCEDURE — 86850 RBC ANTIBODY SCREEN: CPT | Performed by: NURSE PRACTITIONER

## 2022-05-30 PROCEDURE — 80061 LIPID PANEL: CPT | Performed by: NURSE PRACTITIONER

## 2022-05-30 PROCEDURE — 85576 BLOOD PLATELET AGGREGATION: CPT | Performed by: NURSE PRACTITIONER

## 2022-05-30 RX ORDER — ONDANSETRON 2 MG/ML
4 INJECTION INTRAMUSCULAR; INTRAVENOUS EVERY 6 HOURS PRN
Status: DISCONTINUED | OUTPATIENT
Start: 2022-05-30 | End: 2022-05-31

## 2022-05-30 RX ORDER — NITROGLYCERIN 0.4 MG/1
0.4 TABLET SUBLINGUAL
Status: DISCONTINUED | OUTPATIENT
Start: 2022-05-30 | End: 2022-05-31

## 2022-05-30 RX ORDER — SODIUM CHLORIDE 0.9 % (FLUSH) 0.9 %
10 SYRINGE (ML) INJECTION AS NEEDED
Status: DISCONTINUED | OUTPATIENT
Start: 2022-05-30 | End: 2022-05-31

## 2022-05-30 RX ORDER — ACETAMINOPHEN 325 MG/1
650 TABLET ORAL EVERY 4 HOURS PRN
Status: DISCONTINUED | OUTPATIENT
Start: 2022-05-30 | End: 2022-05-31

## 2022-05-30 RX ORDER — CEFAZOLIN SODIUM 2 G/100ML
2 INJECTION, SOLUTION INTRAVENOUS
Status: COMPLETED | OUTPATIENT
Start: 2022-05-31 | End: 2022-05-31

## 2022-05-30 RX ORDER — CHLORHEXIDINE GLUCONATE 500 MG/1
1 CLOTH TOPICAL EVERY 12 HOURS
Status: DISCONTINUED | OUTPATIENT
Start: 2022-05-30 | End: 2022-05-31

## 2022-05-30 RX ORDER — SODIUM CHLORIDE 0.9 % (FLUSH) 0.9 %
10 SYRINGE (ML) INJECTION EVERY 12 HOURS SCHEDULED
Status: DISCONTINUED | OUTPATIENT
Start: 2022-05-30 | End: 2022-05-31

## 2022-05-30 RX ORDER — CHLORHEXIDINE GLUCONATE 0.12 MG/ML
15 RINSE ORAL EVERY 12 HOURS SCHEDULED
Status: COMPLETED | OUTPATIENT
Start: 2022-05-30 | End: 2022-05-31

## 2022-05-30 RX ADMIN — Medication 10 ML: at 20:27

## 2022-05-30 RX ADMIN — CHLORHEXIDINE GLUCONATE 1 APPLICATION: 500 CLOTH TOPICAL at 20:25

## 2022-05-30 RX ADMIN — CHLORHEXIDINE GLUCONATE 15 ML: 1.2 SOLUTION ORAL at 20:23

## 2022-05-30 RX ADMIN — MUPIROCIN 1 APPLICATION: 20 OINTMENT TOPICAL at 20:25

## 2022-05-31 ENCOUNTER — ANCILLARY PROCEDURE (OUTPATIENT)
Dept: PERIOP | Facility: HOSPITAL | Age: 47
End: 2022-05-31

## 2022-05-31 ENCOUNTER — ANESTHESIA (OUTPATIENT)
Dept: PERIOP | Facility: HOSPITAL | Age: 47
End: 2022-05-31

## 2022-05-31 ENCOUNTER — APPOINTMENT (OUTPATIENT)
Dept: GENERAL RADIOLOGY | Facility: HOSPITAL | Age: 47
End: 2022-05-31

## 2022-05-31 PROBLEM — Z95.2 S/P AVR (AORTIC VALVE REPLACEMENT): Status: ACTIVE | Noted: 2022-05-31

## 2022-05-31 LAB
ACT BLD: 130 SECONDS (ref 82–152)
ACT BLD: 148 SECONDS (ref 82–152)
ACT BLD: 398 SECONDS (ref 82–152)
ACT BLD: 434 SECONDS (ref 82–152)
ACT BLD: 452 SECONDS (ref 82–152)
ALBUMIN SERPL-MCNC: 4.9 G/DL (ref 3.5–5.2)
ALBUMIN SERPL-MCNC: 5.4 G/DL (ref 3.5–5.2)
ANION GAP SERPL CALCULATED.3IONS-SCNC: 10.5 MMOL/L (ref 5–15)
ANION GAP SERPL CALCULATED.3IONS-SCNC: 11 MMOL/L (ref 5–15)
ANION GAP SERPL CALCULATED.3IONS-SCNC: 12 MMOL/L (ref 5–15)
APTT PPP: 36.8 SECONDS (ref 22.7–35.4)
ARTERIAL PATENCY WRIST A: ABNORMAL
ARTERIAL PATENCY WRIST A: ABNORMAL
ATMOSPHERIC PRESS: 751.5 MMHG
ATMOSPHERIC PRESS: 752.7 MMHG
BACTERIA SPEC AEROBE CULT: NO GROWTH
BASE EXCESS BLDA CALC-SCNC: -1 MMOL/L (ref -5–5)
BASE EXCESS BLDA CALC-SCNC: -1.7 MMOL/L (ref 0–2)
BASE EXCESS BLDA CALC-SCNC: -1.7 MMOL/L (ref 0–2)
BASE EXCESS BLDA CALC-SCNC: -2 MMOL/L (ref -5–5)
BASE EXCESS BLDA CALC-SCNC: 0 MMOL/L (ref -5–5)
BASE EXCESS BLDA CALC-SCNC: 3 MMOL/L (ref -5–5)
BASE EXCESS BLDA CALC-SCNC: 3 MMOL/L (ref -5–5)
BASOPHILS # BLD AUTO: 0.07 10*3/MM3 (ref 0–0.2)
BASOPHILS NFR BLD AUTO: 0.4 % (ref 0–1.5)
BDY SITE: ABNORMAL
BDY SITE: ABNORMAL
BUN SERPL-MCNC: 10 MG/DL (ref 6–20)
BUN SERPL-MCNC: 8 MG/DL (ref 6–20)
BUN SERPL-MCNC: 8 MG/DL (ref 6–20)
BUN/CREAT SERPL: 10.7 (ref 7–25)
BUN/CREAT SERPL: 12.8 (ref 7–25)
BUN/CREAT SERPL: 8.1 (ref 7–25)
CA-I BLD-MCNC: 5.4 MG/DL (ref 4.6–5.4)
CA-I BLDA-SCNC: ABNORMAL MMOL/L
CA-I SERPL ISE-MCNC: 1.34 MMOL/L (ref 1.15–1.35)
CALCIUM SPEC-SCNC: 8.6 MG/DL (ref 8.6–10.5)
CALCIUM SPEC-SCNC: 8.8 MG/DL (ref 8.6–10.5)
CALCIUM SPEC-SCNC: 8.9 MG/DL (ref 8.6–10.5)
CHLORIDE SERPL-SCNC: 104 MMOL/L (ref 98–107)
CHLORIDE SERPL-SCNC: 105 MMOL/L (ref 98–107)
CHLORIDE SERPL-SCNC: 108 MMOL/L (ref 98–107)
CO2 BLDA-SCNC: 25 MMOL/L (ref 24–29)
CO2 BLDA-SCNC: 26 MMOL/L (ref 24–29)
CO2 BLDA-SCNC: 27 MMOL/L (ref 24–29)
CO2 BLDA-SCNC: 29 MMOL/L (ref 24–29)
CO2 BLDA-SCNC: 32 MMOL/L (ref 24–29)
CO2 SERPL-SCNC: 22 MMOL/L (ref 22–29)
CO2 SERPL-SCNC: 22.5 MMOL/L (ref 22–29)
CO2 SERPL-SCNC: 25 MMOL/L (ref 22–29)
CREAT SERPL-MCNC: 0.75 MG/DL (ref 0.76–1.27)
CREAT SERPL-MCNC: 0.78 MG/DL (ref 0.76–1.27)
CREAT SERPL-MCNC: 0.99 MG/DL (ref 0.76–1.27)
DEPRECATED RDW RBC AUTO: 39.7 FL (ref 37–54)
DEPRECATED RDW RBC AUTO: 42.5 FL (ref 37–54)
EGFRCR SERPLBLD CKD-EPI 2021: 110 ML/MIN/1.73
EGFRCR SERPLBLD CKD-EPI 2021: 111.3 ML/MIN/1.73
EGFRCR SERPLBLD CKD-EPI 2021: 94 ML/MIN/1.73
EOSINOPHIL # BLD AUTO: 0.19 10*3/MM3 (ref 0–0.4)
EOSINOPHIL NFR BLD AUTO: 1.2 % (ref 0.3–6.2)
ERYTHROCYTE [DISTWIDTH] IN BLOOD BY AUTOMATED COUNT: 12.1 % (ref 12.3–15.4)
ERYTHROCYTE [DISTWIDTH] IN BLOOD BY AUTOMATED COUNT: 12.3 % (ref 12.3–15.4)
FIBRINOGEN PPP-MCNC: 215 MG/DL (ref 219–464)
GLUCOSE BLDC GLUCOMTR-MCNC: 120 MG/DL (ref 70–130)
GLUCOSE BLDC GLUCOMTR-MCNC: 121 MG/DL (ref 70–130)
GLUCOSE BLDC GLUCOMTR-MCNC: 121 MG/DL (ref 70–130)
GLUCOSE BLDC GLUCOMTR-MCNC: 126 MG/DL (ref 70–130)
GLUCOSE BLDC GLUCOMTR-MCNC: 126 MG/DL (ref 70–130)
GLUCOSE BLDC GLUCOMTR-MCNC: 131 MG/DL (ref 70–130)
GLUCOSE BLDC GLUCOMTR-MCNC: 131 MG/DL (ref 70–130)
GLUCOSE BLDC GLUCOMTR-MCNC: 134 MG/DL (ref 70–130)
GLUCOSE BLDC GLUCOMTR-MCNC: 137 MG/DL (ref 70–130)
GLUCOSE BLDC GLUCOMTR-MCNC: 138 MG/DL (ref 70–130)
GLUCOSE BLDC GLUCOMTR-MCNC: 147 MG/DL (ref 70–130)
GLUCOSE BLDC GLUCOMTR-MCNC: 155 MG/DL (ref 70–130)
GLUCOSE BLDC GLUCOMTR-MCNC: 157 MG/DL (ref 70–130)
GLUCOSE BLDC GLUCOMTR-MCNC: 157 MG/DL (ref 70–130)
GLUCOSE BLDC GLUCOMTR-MCNC: 161 MG/DL (ref 70–130)
GLUCOSE BLDC GLUCOMTR-MCNC: 172 MG/DL (ref 70–130)
GLUCOSE BLDC GLUCOMTR-MCNC: 90 MG/DL (ref 70–130)
GLUCOSE SERPL-MCNC: 128 MG/DL (ref 65–99)
GLUCOSE SERPL-MCNC: 134 MG/DL (ref 65–99)
GLUCOSE SERPL-MCNC: 95 MG/DL (ref 65–99)
HCO3 BLDA-SCNC: 23.5 MMOL/L (ref 22–26)
HCO3 BLDA-SCNC: 24.7 MMOL/L (ref 22–26)
HCO3 BLDA-SCNC: 24.9 MMOL/L (ref 22–28)
HCO3 BLDA-SCNC: 25.4 MMOL/L (ref 22–26)
HCO3 BLDA-SCNC: 26.5 MMOL/L (ref 22–28)
HCO3 BLDA-SCNC: 28 MMOL/L (ref 22–26)
HCO3 BLDA-SCNC: 29.9 MMOL/L (ref 22–26)
HCT VFR BLD AUTO: 36.1 % (ref 37.5–51)
HCT VFR BLD AUTO: 37.9 % (ref 37.5–51)
HCT VFR BLDA CALC: 29 % (ref 38–51)
HCT VFR BLDA CALC: 30 % (ref 38–51)
HCT VFR BLDA CALC: 32 % (ref 38–51)
HCT VFR BLDA CALC: 33 % (ref 38–51)
HCT VFR BLDA CALC: 40 % (ref 38–51)
HGB BLD-MCNC: 11.9 G/DL (ref 13–17.7)
HGB BLD-MCNC: 13.1 G/DL (ref 13–17.7)
HGB BLDA-MCNC: 10.2 G/DL (ref 12–17)
HGB BLDA-MCNC: 10.9 G/DL (ref 12–17)
HGB BLDA-MCNC: 11.2 G/DL (ref 12–17)
HGB BLDA-MCNC: 13.6 G/DL (ref 12–17)
HGB BLDA-MCNC: 9.9 G/DL (ref 12–17)
IMM GRANULOCYTES # BLD AUTO: 0.09 10*3/MM3 (ref 0–0.05)
IMM GRANULOCYTES NFR BLD AUTO: 0.6 % (ref 0–0.5)
INHALED O2 CONCENTRATION: 100 %
INHALED O2 CONCENTRATION: 100 %
INR PPP: 1.55 (ref 0.9–1.1)
LYMPHOCYTES # BLD AUTO: 0.57 10*3/MM3 (ref 0.7–3.1)
LYMPHOCYTES NFR BLD AUTO: 3.5 % (ref 19.6–45.3)
MAGNESIUM SERPL-MCNC: 2.2 MG/DL (ref 1.6–2.6)
MAGNESIUM SERPL-MCNC: 2.3 MG/DL (ref 1.6–2.6)
MCH RBC QN AUTO: 31.2 PG (ref 26.6–33)
MCH RBC QN AUTO: 31.4 PG (ref 26.6–33)
MCHC RBC AUTO-ENTMCNC: 33 G/DL (ref 31.5–35.7)
MCHC RBC AUTO-ENTMCNC: 34.6 G/DL (ref 31.5–35.7)
MCV RBC AUTO: 90.9 FL (ref 79–97)
MCV RBC AUTO: 94.5 FL (ref 79–97)
MODALITY: ABNORMAL
MODALITY: ABNORMAL
MONOCYTES # BLD AUTO: 0.95 10*3/MM3 (ref 0.1–0.9)
MONOCYTES NFR BLD AUTO: 5.8 % (ref 5–12)
NEUTROPHILS NFR BLD AUTO: 14.39 10*3/MM3 (ref 1.7–7)
NEUTROPHILS NFR BLD AUTO: 88.5 % (ref 42.7–76)
NRBC BLD AUTO-RTO: 0 /100 WBC (ref 0–0.2)
O2 A-A PPRESDIFF RESPIRATORY: 0.2 MMHG
O2 A-A PPRESDIFF RESPIRATORY: 0.6 MMHG
PCO2 BLDA: 42.5 MM HG (ref 35–45)
PCO2 BLDA: 43 MM HG (ref 35–45)
PCO2 BLDA: 43 MM HG (ref 35–45)
PCO2 BLDA: 47.7 MM HG (ref 35–45)
PCO2 BLDA: 48.3 MM HG (ref 35–45)
PCO2 BLDA: 59 MM HG (ref 35–45)
PCO2 BLDA: 61.9 MM HG (ref 35–45)
PEEP RESPIRATORY: 7.5 CM[H2O]
PEEP RESPIRATORY: 7.5 CM[H2O]
PH BLDA: 7.26 PH UNITS (ref 7.35–7.45)
PH BLDA: 7.31 PH UNITS (ref 7.35–7.6)
PH BLDA: 7.32 PH UNITS (ref 7.35–7.45)
PH BLDA: 7.38 PH UNITS (ref 7.35–7.6)
PH BLDA: 7.38 PH UNITS (ref 7.35–7.6)
PH BLDA: 7.39 PH UNITS (ref 7.35–7.6)
PH BLDA: 7.4 PH UNITS (ref 7.35–7.6)
PHOSPHATE SERPL-MCNC: 3.1 MG/DL (ref 2.5–4.5)
PHOSPHATE SERPL-MCNC: 4.1 MG/DL (ref 2.5–4.5)
PLATELET # BLD AUTO: 191 10*3/MM3 (ref 140–450)
PLATELET # BLD AUTO: 193 10*3/MM3 (ref 140–450)
PMV BLD AUTO: 10.1 FL (ref 6–12)
PMV BLD AUTO: 9.7 FL (ref 6–12)
PO2 BLDA: 115.3 MM HG (ref 80–100)
PO2 BLDA: 393.1 MM HG (ref 80–100)
PO2 BLDA: 397 MMHG (ref 80–105)
PO2 BLDA: 45 MMHG (ref 80–105)
PO2 BLDA: 456 MMHG (ref 80–105)
PO2 BLDA: 488 MMHG (ref 80–105)
PO2 BLDA: 498 MMHG (ref 80–105)
POTASSIUM BLDA-SCNC: 3.4 MMOL/L (ref 3.5–4.9)
POTASSIUM BLDA-SCNC: 3.6 MMOL/L (ref 3.5–4.9)
POTASSIUM BLDA-SCNC: 3.7 MMOL/L (ref 3.5–4.9)
POTASSIUM BLDA-SCNC: 4.4 MMOL/L (ref 3.5–4.9)
POTASSIUM BLDA-SCNC: 5 MMOL/L (ref 3.5–4.9)
POTASSIUM SERPL-SCNC: 3.9 MMOL/L (ref 3.5–5.2)
POTASSIUM SERPL-SCNC: 4.3 MMOL/L (ref 3.5–5.2)
POTASSIUM SERPL-SCNC: 4.4 MMOL/L (ref 3.5–5.2)
POTASSIUM SERPL-SCNC: 4.5 MMOL/L (ref 3.5–5.2)
PROTHROMBIN TIME: 18.3 SECONDS (ref 11.7–14.2)
PSV: 8 CMH2O
QT INTERVAL: 397 MS
QT INTERVAL: 401 MS
RBC # BLD AUTO: 3.82 10*6/MM3 (ref 4.14–5.8)
RBC # BLD AUTO: 4.17 10*6/MM3 (ref 4.14–5.8)
SAO2 % BLDA: 100 % (ref 95–98)
SAO2 % BLDA: 78 % (ref 95–98)
SAO2 % BLDCOA: 98.1 % (ref 92–99)
SAO2 % BLDCOA: 99.9 % (ref 92–99)
SET MECH RESP RATE: 12
SODIUM SERPL-SCNC: 139 MMOL/L (ref 136–145)
SODIUM SERPL-SCNC: 140 MMOL/L (ref 136–145)
SODIUM SERPL-SCNC: 141 MMOL/L (ref 136–145)
TOTAL RATE: 12 BREATHS/MINUTE
TOTAL RATE: 21 BREATHS/MINUTE
VENTILATOR MODE: AC
VENTILATOR MODE: AC
VT ON VENT VENT: 780 ML
WBC NRBC COR # BLD: 16.1 10*3/MM3 (ref 3.4–10.8)
WBC NRBC COR # BLD: 16.26 10*3/MM3 (ref 3.4–10.8)

## 2022-05-31 PROCEDURE — 88305 TISSUE EXAM BY PATHOLOGIST: CPT

## 2022-05-31 PROCEDURE — 25010000002 MIDAZOLAM PER 1 MG: Performed by: ANESTHESIOLOGY

## 2022-05-31 PROCEDURE — 5A1221Z PERFORMANCE OF CARDIAC OUTPUT, CONTINUOUS: ICD-10-PCS

## 2022-05-31 PROCEDURE — 94761 N-INVAS EAR/PLS OXIMETRY MLT: CPT

## 2022-05-31 PROCEDURE — 94799 UNLISTED PULMONARY SVC/PX: CPT

## 2022-05-31 PROCEDURE — 88112 CYTOPATH CELL ENHANCE TECH: CPT

## 2022-05-31 PROCEDURE — 33405 REPLACEMENT AORTIC VALVE OPN: CPT

## 2022-05-31 PROCEDURE — 25010000002 FENTANYL CITRATE (PF) 50 MCG/ML SOLUTION: Performed by: ANESTHESIOLOGY

## 2022-05-31 PROCEDURE — 85730 THROMBOPLASTIN TIME PARTIAL: CPT | Performed by: NURSE PRACTITIONER

## 2022-05-31 PROCEDURE — 25010000002 ONDANSETRON PER 1 MG: Performed by: NURSE PRACTITIONER

## 2022-05-31 PROCEDURE — 94760 N-INVAS EAR/PLS OXIMETRY 1: CPT

## 2022-05-31 PROCEDURE — 85025 COMPLETE CBC W/AUTO DIFF WBC: CPT | Performed by: NURSE PRACTITIONER

## 2022-05-31 PROCEDURE — 82803 BLOOD GASES ANY COMBINATION: CPT

## 2022-05-31 PROCEDURE — B24BZZ4 ULTRASONOGRAPHY OF HEART WITH AORTA, TRANSESOPHAGEAL: ICD-10-PCS | Performed by: ANESTHESIOLOGY

## 2022-05-31 PROCEDURE — 87015 SPECIMEN INFECT AGNT CONCNTJ: CPT

## 2022-05-31 PROCEDURE — 25010000002 MAGNESIUM SULFATE IN D5W 1G/100ML (PREMIX) 1-5 GM/100ML-% SOLUTION: Performed by: NURSE PRACTITIONER

## 2022-05-31 PROCEDURE — 87205 SMEAR GRAM STAIN: CPT

## 2022-05-31 PROCEDURE — C1889 IMPLANT/INSERT DEVICE, NOC: HCPCS

## 2022-05-31 PROCEDURE — C1713 ANCHOR/SCREW BN/BN,TIS/BN: HCPCS

## 2022-05-31 PROCEDURE — 82330 ASSAY OF CALCIUM: CPT | Performed by: NURSE PRACTITIONER

## 2022-05-31 PROCEDURE — 25010000002 HEPARIN (PORCINE) PER 1000 UNITS: Performed by: ANESTHESIOLOGY

## 2022-05-31 PROCEDURE — 87070 CULTURE OTHR SPECIMN AEROBIC: CPT

## 2022-05-31 PROCEDURE — 80069 RENAL FUNCTION PANEL: CPT | Performed by: NURSE PRACTITIONER

## 2022-05-31 PROCEDURE — 93318 ECHO TRANSESOPHAGEAL INTRAOP: CPT | Performed by: ANESTHESIOLOGY

## 2022-05-31 PROCEDURE — 84132 ASSAY OF SERUM POTASSIUM: CPT

## 2022-05-31 PROCEDURE — 25010000002 PROTAMINE SULFATE PER 10 MG: Performed by: ANESTHESIOLOGY

## 2022-05-31 PROCEDURE — 85027 COMPLETE CBC AUTOMATED: CPT | Performed by: NURSE PRACTITIONER

## 2022-05-31 PROCEDURE — 4A133B3 MONITORING OF ARTERIAL PRESSURE, PULMONARY, PERCUTANEOUS APPROACH: ICD-10-PCS | Performed by: ANESTHESIOLOGY

## 2022-05-31 PROCEDURE — 88311 DECALCIFY TISSUE: CPT

## 2022-05-31 PROCEDURE — 63710000001 INSULIN REGULAR HUMAN PER 5 UNITS: Performed by: ANESTHESIOLOGY

## 2022-05-31 PROCEDURE — 85610 PROTHROMBIN TIME: CPT | Performed by: NURSE PRACTITIONER

## 2022-05-31 PROCEDURE — 93005 ELECTROCARDIOGRAM TRACING: CPT | Performed by: NURSE PRACTITIONER

## 2022-05-31 PROCEDURE — 82947 ASSAY GLUCOSE BLOOD QUANT: CPT

## 2022-05-31 PROCEDURE — 25010000002 ALBUMIN HUMAN 5% PER 50 ML: Performed by: NURSE PRACTITIONER

## 2022-05-31 PROCEDURE — 87075 CULTR BACTERIA EXCEPT BLOOD: CPT

## 2022-05-31 PROCEDURE — 25010000002 CEFAZOLIN PER 500 MG: Performed by: NURSE PRACTITIONER

## 2022-05-31 PROCEDURE — 82962 GLUCOSE BLOOD TEST: CPT

## 2022-05-31 PROCEDURE — 02HP32Z INSERTION OF MONITORING DEVICE INTO PULMONARY TRUNK, PERCUTANEOUS APPROACH: ICD-10-PCS | Performed by: ANESTHESIOLOGY

## 2022-05-31 PROCEDURE — 85347 COAGULATION TIME ACTIVATED: CPT

## 2022-05-31 PROCEDURE — 4A1239Z MONITORING OF CARDIAC OUTPUT, PERCUTANEOUS APPROACH: ICD-10-PCS | Performed by: ANESTHESIOLOGY

## 2022-05-31 PROCEDURE — 85018 HEMOGLOBIN: CPT

## 2022-05-31 PROCEDURE — C1751 CATH, INF, PER/CENT/MIDLINE: HCPCS | Performed by: ANESTHESIOLOGY

## 2022-05-31 PROCEDURE — 25010000002 PROPOFOL 10 MG/ML EMULSION: Performed by: ANESTHESIOLOGY

## 2022-05-31 PROCEDURE — P9041 ALBUMIN (HUMAN),5%, 50ML: HCPCS | Performed by: NURSE PRACTITIONER

## 2022-05-31 PROCEDURE — 94002 VENT MGMT INPAT INIT DAY: CPT

## 2022-05-31 PROCEDURE — 85384 FIBRINOGEN ACTIVITY: CPT | Performed by: NURSE PRACTITIONER

## 2022-05-31 PROCEDURE — 33405 REPLACEMENT AORTIC VALVE OPN: CPT | Performed by: PHYSICIAN ASSISTANT

## 2022-05-31 PROCEDURE — 71045 X-RAY EXAM CHEST 1 VIEW: CPT

## 2022-05-31 PROCEDURE — 85014 HEMATOCRIT: CPT

## 2022-05-31 PROCEDURE — 83735 ASSAY OF MAGNESIUM: CPT | Performed by: NURSE PRACTITIONER

## 2022-05-31 PROCEDURE — 25010000002 METOCLOPRAMIDE PER 10 MG: Performed by: NURSE PRACTITIONER

## 2022-05-31 PROCEDURE — 25010000002 MORPHINE PER 10 MG: Performed by: NURSE PRACTITIONER

## 2022-05-31 PROCEDURE — 80048 BASIC METABOLIC PNL TOTAL CA: CPT | Performed by: NURSE PRACTITIONER

## 2022-05-31 PROCEDURE — 02RF08Z REPLACEMENT OF AORTIC VALVE WITH ZOOPLASTIC TISSUE, OPEN APPROACH: ICD-10-PCS

## 2022-05-31 DEVICE — SS SUTURE, 4 PER SLEEVE
Type: IMPLANTABLE DEVICE | Site: STERNUM | Status: FUNCTIONAL
Brand: MYO/WIRE II

## 2022-05-31 DEVICE — VLV PERICARD PERIMOUNT MAGNA EASE 25: Type: IMPLANTABLE DEVICE | Site: HEART | Status: FUNCTIONAL

## 2022-05-31 DEVICE — SS SUTURE, 3 PER SLEEVE
Type: IMPLANTABLE DEVICE | Site: STERNUM | Status: FUNCTIONAL
Brand: MYO/WIRE II

## 2022-05-31 RX ORDER — POTASSIUM CHLORIDE 29.8 MG/ML
20 INJECTION INTRAVENOUS
Status: COMPLETED | OUTPATIENT
Start: 2022-05-31 | End: 2022-06-01

## 2022-05-31 RX ORDER — POTASSIUM CHLORIDE 750 MG/1
40 TABLET, FILM COATED, EXTENDED RELEASE ORAL AS NEEDED
Status: DISCONTINUED | OUTPATIENT
Start: 2022-05-31 | End: 2022-06-05 | Stop reason: HOSPADM

## 2022-05-31 RX ORDER — PANTOPRAZOLE SODIUM 40 MG/1
40 TABLET, DELAYED RELEASE ORAL EVERY MORNING
Status: DISCONTINUED | OUTPATIENT
Start: 2022-06-01 | End: 2022-06-05 | Stop reason: HOSPADM

## 2022-05-31 RX ORDER — POTASSIUM CHLORIDE 29.8 MG/ML
20 INJECTION INTRAVENOUS
Status: DISCONTINUED | OUTPATIENT
Start: 2022-05-31 | End: 2022-06-01

## 2022-05-31 RX ORDER — FENTANYL CITRATE 50 UG/ML
INJECTION, SOLUTION INTRAMUSCULAR; INTRAVENOUS AS NEEDED
Status: DISCONTINUED | OUTPATIENT
Start: 2022-05-31 | End: 2022-05-31 | Stop reason: SURG

## 2022-05-31 RX ORDER — ACETAMINOPHEN 325 MG/1
650 TABLET ORAL EVERY 4 HOURS
Status: DISCONTINUED | OUTPATIENT
Start: 2022-05-31 | End: 2022-06-01

## 2022-05-31 RX ORDER — ACETAMINOPHEN 325 MG/1
650 TABLET ORAL EVERY 4 HOURS PRN
Status: DISCONTINUED | OUTPATIENT
Start: 2022-06-01 | End: 2022-06-05 | Stop reason: HOSPADM

## 2022-05-31 RX ORDER — NOREPINEPHRINE BIT/0.9 % NACL 8 MG/250ML
.02-.3 INFUSION BOTTLE (ML) INTRAVENOUS CONTINUOUS PRN
Status: DISCONTINUED | OUTPATIENT
Start: 2022-05-31 | End: 2022-06-01

## 2022-05-31 RX ORDER — SODIUM CHLORIDE 9 MG/ML
15 INJECTION, SOLUTION INTRAVENOUS CONTINUOUS
Status: DISCONTINUED | OUTPATIENT
Start: 2022-05-31 | End: 2022-06-05 | Stop reason: HOSPADM

## 2022-05-31 RX ORDER — MEPERIDINE HYDROCHLORIDE 25 MG/ML
25 INJECTION INTRAMUSCULAR; INTRAVENOUS; SUBCUTANEOUS EVERY 4 HOURS PRN
Status: ACTIVE | OUTPATIENT
Start: 2022-05-31 | End: 2022-05-31

## 2022-05-31 RX ORDER — ASPIRIN 81 MG/1
81 TABLET ORAL DAILY
Status: DISCONTINUED | OUTPATIENT
Start: 2022-06-01 | End: 2022-06-05 | Stop reason: HOSPADM

## 2022-05-31 RX ORDER — CHLORHEXIDINE GLUCONATE 0.12 MG/ML
15 RINSE ORAL EVERY 12 HOURS
Status: DISCONTINUED | OUTPATIENT
Start: 2022-05-31 | End: 2022-05-31

## 2022-05-31 RX ORDER — MIDAZOLAM HYDROCHLORIDE 1 MG/ML
INJECTION INTRAMUSCULAR; INTRAVENOUS AS NEEDED
Status: DISCONTINUED | OUTPATIENT
Start: 2022-05-31 | End: 2022-05-31 | Stop reason: SURG

## 2022-05-31 RX ORDER — CEFAZOLIN SODIUM 2 G/100ML
2 INJECTION, SOLUTION INTRAVENOUS EVERY 8 HOURS
Status: COMPLETED | OUTPATIENT
Start: 2022-05-31 | End: 2022-06-02

## 2022-05-31 RX ORDER — BISACODYL 5 MG/1
10 TABLET, DELAYED RELEASE ORAL DAILY PRN
Status: DISCONTINUED | OUTPATIENT
Start: 2022-05-31 | End: 2022-06-05 | Stop reason: HOSPADM

## 2022-05-31 RX ORDER — MIDAZOLAM HYDROCHLORIDE 1 MG/ML
2 INJECTION INTRAMUSCULAR; INTRAVENOUS
Status: DISCONTINUED | OUTPATIENT
Start: 2022-05-31 | End: 2022-05-31

## 2022-05-31 RX ORDER — POTASSIUM CHLORIDE 1.5 G/1.77G
40 POWDER, FOR SOLUTION ORAL AS NEEDED
Status: DISCONTINUED | OUTPATIENT
Start: 2022-05-31 | End: 2022-06-05 | Stop reason: HOSPADM

## 2022-05-31 RX ORDER — METHADONE HYDROCHLORIDE 10 MG/ML
10 INJECTION, SOLUTION INTRAMUSCULAR; INTRAVENOUS; SUBCUTANEOUS ONCE
Status: DISCONTINUED | OUTPATIENT
Start: 2022-05-31 | End: 2022-06-01

## 2022-05-31 RX ORDER — CHLORHEXIDINE GLUCONATE 0.12 MG/ML
15 RINSE ORAL EVERY 12 HOURS
Status: COMPLETED | OUTPATIENT
Start: 2022-05-31 | End: 2022-06-02

## 2022-05-31 RX ORDER — CYCLOBENZAPRINE HCL 10 MG
10 TABLET ORAL EVERY 8 HOURS PRN
Status: DISCONTINUED | OUTPATIENT
Start: 2022-06-01 | End: 2022-06-05 | Stop reason: HOSPADM

## 2022-05-31 RX ORDER — ACETAMINOPHEN 650 MG/1
650 SUPPOSITORY RECTAL EVERY 4 HOURS PRN
Status: DISCONTINUED | OUTPATIENT
Start: 2022-06-01 | End: 2022-06-05 | Stop reason: HOSPADM

## 2022-05-31 RX ORDER — MORPHINE SULFATE 2 MG/ML
1 INJECTION, SOLUTION INTRAMUSCULAR; INTRAVENOUS EVERY 4 HOURS PRN
Status: DISCONTINUED | OUTPATIENT
Start: 2022-05-31 | End: 2022-06-05 | Stop reason: HOSPADM

## 2022-05-31 RX ORDER — DOPAMINE HYDROCHLORIDE 160 MG/100ML
2-20 INJECTION, SOLUTION INTRAVENOUS CONTINUOUS PRN
Status: DISCONTINUED | OUTPATIENT
Start: 2022-05-31 | End: 2022-06-01

## 2022-05-31 RX ORDER — ATORVASTATIN CALCIUM 20 MG/1
40 TABLET, FILM COATED ORAL NIGHTLY
Status: DISCONTINUED | OUTPATIENT
Start: 2022-05-31 | End: 2022-06-05 | Stop reason: HOSPADM

## 2022-05-31 RX ORDER — SODIUM CHLORIDE 9 MG/ML
INJECTION, SOLUTION INTRAVENOUS CONTINUOUS PRN
Status: DISCONTINUED | OUTPATIENT
Start: 2022-05-31 | End: 2022-05-31 | Stop reason: SURG

## 2022-05-31 RX ORDER — POTASSIUM CHLORIDE 7.45 MG/ML
10 INJECTION INTRAVENOUS
Status: DISCONTINUED | OUTPATIENT
Start: 2022-05-31 | End: 2022-06-05 | Stop reason: HOSPADM

## 2022-05-31 RX ORDER — NITROGLYCERIN 20 MG/100ML
5-200 INJECTION INTRAVENOUS
Status: DISCONTINUED | OUTPATIENT
Start: 2022-05-31 | End: 2022-06-01

## 2022-05-31 RX ORDER — AMOXICILLIN 250 MG
2 CAPSULE ORAL NIGHTLY
Status: DISCONTINUED | OUTPATIENT
Start: 2022-06-01 | End: 2022-06-05 | Stop reason: HOSPADM

## 2022-05-31 RX ORDER — ROCURONIUM BROMIDE 10 MG/ML
INJECTION, SOLUTION INTRAVENOUS AS NEEDED
Status: DISCONTINUED | OUTPATIENT
Start: 2022-05-31 | End: 2022-05-31 | Stop reason: SURG

## 2022-05-31 RX ORDER — PROTAMINE SULFATE 10 MG/ML
INJECTION, SOLUTION INTRAVENOUS AS NEEDED
Status: DISCONTINUED | OUTPATIENT
Start: 2022-05-31 | End: 2022-05-31 | Stop reason: SURG

## 2022-05-31 RX ORDER — MILRINONE LACTATE 0.2 MG/ML
.25-.375 INJECTION, SOLUTION INTRAVENOUS CONTINUOUS PRN
Status: DISCONTINUED | OUTPATIENT
Start: 2022-05-31 | End: 2022-06-01

## 2022-05-31 RX ORDER — ACETAMINOPHEN 160 MG/5ML
650 SOLUTION ORAL EVERY 4 HOURS PRN
Status: DISCONTINUED | OUTPATIENT
Start: 2022-06-01 | End: 2022-06-05 | Stop reason: HOSPADM

## 2022-05-31 RX ORDER — ONDANSETRON 2 MG/ML
4 INJECTION INTRAMUSCULAR; INTRAVENOUS EVERY 6 HOURS PRN
Status: DISCONTINUED | OUTPATIENT
Start: 2022-05-31 | End: 2022-06-05 | Stop reason: HOSPADM

## 2022-05-31 RX ORDER — HYDROCODONE BITARTRATE AND ACETAMINOPHEN 5; 325 MG/1; MG/1
2 TABLET ORAL EVERY 4 HOURS PRN
Status: DISCONTINUED | OUTPATIENT
Start: 2022-05-31 | End: 2022-06-05 | Stop reason: HOSPADM

## 2022-05-31 RX ORDER — DEXMEDETOMIDINE HYDROCHLORIDE 4 UG/ML
INJECTION, SOLUTION INTRAVENOUS CONTINUOUS PRN
Status: DISCONTINUED | OUTPATIENT
Start: 2022-05-31 | End: 2022-05-31 | Stop reason: SURG

## 2022-05-31 RX ORDER — POLYETHYLENE GLYCOL 3350 17 G/17G
17 POWDER, FOR SOLUTION ORAL DAILY PRN
Status: DISCONTINUED | OUTPATIENT
Start: 2022-05-31 | End: 2022-06-05 | Stop reason: HOSPADM

## 2022-05-31 RX ORDER — BISACODYL 10 MG
10 SUPPOSITORY, RECTAL RECTAL DAILY PRN
Status: DISCONTINUED | OUTPATIENT
Start: 2022-06-01 | End: 2022-06-05 | Stop reason: HOSPADM

## 2022-05-31 RX ORDER — NALOXONE HCL 0.4 MG/ML
0.4 VIAL (ML) INJECTION
Status: DISCONTINUED | OUTPATIENT
Start: 2022-05-31 | End: 2022-06-05 | Stop reason: HOSPADM

## 2022-05-31 RX ORDER — ALPRAZOLAM 0.25 MG/1
0.25 TABLET ORAL EVERY 8 HOURS PRN
Status: DISCONTINUED | OUTPATIENT
Start: 2022-05-31 | End: 2022-06-05 | Stop reason: HOSPADM

## 2022-05-31 RX ORDER — MORPHINE SULFATE 2 MG/ML
4 INJECTION, SOLUTION INTRAMUSCULAR; INTRAVENOUS
Status: DISCONTINUED | OUTPATIENT
Start: 2022-05-31 | End: 2022-05-31

## 2022-05-31 RX ORDER — AMINOCAPROIC ACID 250 MG/ML
INJECTION, SOLUTION INTRAVENOUS AS NEEDED
Status: DISCONTINUED | OUTPATIENT
Start: 2022-05-31 | End: 2022-05-31 | Stop reason: SURG

## 2022-05-31 RX ORDER — ACETAMINOPHEN 650 MG/1
650 SUPPOSITORY RECTAL EVERY 4 HOURS
Status: DISCONTINUED | OUTPATIENT
Start: 2022-05-31 | End: 2022-06-01

## 2022-05-31 RX ORDER — PROPOFOL 10 MG/ML
VIAL (ML) INTRAVENOUS CONTINUOUS PRN
Status: DISCONTINUED | OUTPATIENT
Start: 2022-05-31 | End: 2022-05-31 | Stop reason: SURG

## 2022-05-31 RX ORDER — OXYCODONE HYDROCHLORIDE 5 MG/1
10 TABLET ORAL EVERY 4 HOURS PRN
Status: DISCONTINUED | OUTPATIENT
Start: 2022-05-31 | End: 2022-06-05 | Stop reason: HOSPADM

## 2022-05-31 RX ORDER — DEXTROSE MONOHYDRATE 25 G/50ML
10-50 INJECTION, SOLUTION INTRAVENOUS
Status: DISCONTINUED | OUTPATIENT
Start: 2022-05-31 | End: 2022-06-01

## 2022-05-31 RX ORDER — ACETAMINOPHEN 160 MG/5ML
650 SOLUTION ORAL EVERY 4 HOURS
Status: DISCONTINUED | OUTPATIENT
Start: 2022-05-31 | End: 2022-06-01

## 2022-05-31 RX ORDER — PROPOFOL 10 MG/ML
VIAL (ML) INTRAVENOUS AS NEEDED
Status: DISCONTINUED | OUTPATIENT
Start: 2022-05-31 | End: 2022-05-31 | Stop reason: SURG

## 2022-05-31 RX ORDER — METOCLOPRAMIDE HYDROCHLORIDE 5 MG/ML
10 INJECTION INTRAMUSCULAR; INTRAVENOUS EVERY 6 HOURS
Status: COMPLETED | OUTPATIENT
Start: 2022-05-31 | End: 2022-06-01

## 2022-05-31 RX ORDER — DEXMEDETOMIDINE HYDROCHLORIDE 4 UG/ML
.2-1.5 INJECTION INTRAVENOUS
Status: DISCONTINUED | OUTPATIENT
Start: 2022-05-31 | End: 2022-06-01

## 2022-05-31 RX ORDER — PHENYLEPHRINE HCL IN 0.9% NACL 0.5 MG/5ML
.2-2 SYRINGE (ML) INTRAVENOUS CONTINUOUS PRN
Status: DISCONTINUED | OUTPATIENT
Start: 2022-05-31 | End: 2022-06-01

## 2022-05-31 RX ORDER — ENOXAPARIN SODIUM 100 MG/ML
40 INJECTION SUBCUTANEOUS DAILY
Status: DISCONTINUED | OUTPATIENT
Start: 2022-06-01 | End: 2022-06-05 | Stop reason: HOSPADM

## 2022-05-31 RX ORDER — NICOTINE POLACRILEX 4 MG
15 LOZENGE BUCCAL
Status: DISCONTINUED | OUTPATIENT
Start: 2022-05-31 | End: 2022-06-01

## 2022-05-31 RX ORDER — HEPARIN SODIUM 1000 [USP'U]/ML
INJECTION, SOLUTION INTRAVENOUS; SUBCUTANEOUS AS NEEDED
Status: DISCONTINUED | OUTPATIENT
Start: 2022-05-31 | End: 2022-05-31 | Stop reason: SURG

## 2022-05-31 RX ORDER — PANTOPRAZOLE SODIUM 40 MG/10ML
40 INJECTION, POWDER, LYOPHILIZED, FOR SOLUTION INTRAVENOUS ONCE
Status: COMPLETED | OUTPATIENT
Start: 2022-05-31 | End: 2022-05-31

## 2022-05-31 RX ORDER — ALBUMIN, HUMAN INJ 5% 5 %
1500 SOLUTION INTRAVENOUS AS NEEDED
Status: DISCONTINUED | OUTPATIENT
Start: 2022-05-31 | End: 2022-06-01

## 2022-05-31 RX ORDER — MAGNESIUM SULFATE 1 G/100ML
1 INJECTION INTRAVENOUS EVERY 8 HOURS
Status: COMPLETED | OUTPATIENT
Start: 2022-05-31 | End: 2022-06-01

## 2022-05-31 RX ADMIN — OXYCODONE 10 MG: 5 TABLET ORAL at 17:00

## 2022-05-31 RX ADMIN — FENTANYL CITRATE 250 MCG: 0.05 INJECTION, SOLUTION INTRAMUSCULAR; INTRAVENOUS at 07:18

## 2022-05-31 RX ADMIN — ATORVASTATIN CALCIUM 40 MG: 20 TABLET, FILM COATED ORAL at 20:03

## 2022-05-31 RX ADMIN — DEXMEDETOMIDINE HYDROCHLORIDE 0.5 MCG/KG/HR: 4 INJECTION, SOLUTION INTRAVENOUS at 07:40

## 2022-05-31 RX ADMIN — ACETAMINOPHEN 325MG 650 MG: 325 TABLET ORAL at 20:03

## 2022-05-31 RX ADMIN — OXYCODONE 10 MG: 5 TABLET ORAL at 20:55

## 2022-05-31 RX ADMIN — CHLORHEXIDINE GLUCONATE 15 ML: 1.2 SOLUTION ORAL at 20:03

## 2022-05-31 RX ADMIN — MORPHINE SULFATE 2 MG: 2 INJECTION, SOLUTION INTRAMUSCULAR; INTRAVENOUS at 14:47

## 2022-05-31 RX ADMIN — ALBUMIN HUMAN 250 ML: 0.05 INJECTION, SOLUTION INTRAVENOUS at 14:25

## 2022-05-31 RX ADMIN — PANTOPRAZOLE SODIUM 40 MG: 40 INJECTION, POWDER, FOR SOLUTION INTRAVENOUS at 11:32

## 2022-05-31 RX ADMIN — ACETAMINOPHEN 325MG 650 MG: 325 TABLET ORAL at 17:00

## 2022-05-31 RX ADMIN — PROTAMINE SULFATE 300 MG: 10 INJECTION, SOLUTION INTRAVENOUS at 09:48

## 2022-05-31 RX ADMIN — MAGNESIUM SULFATE HEPTAHYDRATE 1 G: 1 INJECTION, SOLUTION INTRAVENOUS at 12:16

## 2022-05-31 RX ADMIN — FENTANYL CITRATE 100 MCG: 0.05 INJECTION, SOLUTION INTRAMUSCULAR; INTRAVENOUS at 07:55

## 2022-05-31 RX ADMIN — HEPARIN SODIUM 23000 UNITS: 1000 INJECTION INTRAVENOUS; SUBCUTANEOUS at 08:08

## 2022-05-31 RX ADMIN — ALBUMIN HUMAN 250 ML: 0.05 INJECTION, SOLUTION INTRAVENOUS at 20:34

## 2022-05-31 RX ADMIN — MORPHINE SULFATE 4 MG: 2 INJECTION, SOLUTION INTRAMUSCULAR; INTRAVENOUS at 15:30

## 2022-05-31 RX ADMIN — FENTANYL CITRATE 100 MCG: 0.05 INJECTION, SOLUTION INTRAMUSCULAR; INTRAVENOUS at 07:45

## 2022-05-31 RX ADMIN — FENTANYL CITRATE 50 MCG: 0.05 INJECTION, SOLUTION INTRAMUSCULAR; INTRAVENOUS at 08:19

## 2022-05-31 RX ADMIN — MIDAZOLAM 2 MG: 1 INJECTION INTRAMUSCULAR; INTRAVENOUS at 07:05

## 2022-05-31 RX ADMIN — MAGNESIUM SULFATE HEPTAHYDRATE 1 G: 1 INJECTION, SOLUTION INTRAVENOUS at 20:04

## 2022-05-31 RX ADMIN — AMINOCAPROIC ACID 10 G: 250 INJECTION, SOLUTION INTRAVENOUS at 08:00

## 2022-05-31 RX ADMIN — AMINOCAPROIC ACID 10 G: 250 INJECTION, SOLUTION INTRAVENOUS at 10:05

## 2022-05-31 RX ADMIN — MUPIROCIN 1 APPLICATION: 20 OINTMENT TOPICAL at 20:03

## 2022-05-31 RX ADMIN — ONDANSETRON 4 MG: 2 INJECTION INTRAMUSCULAR; INTRAVENOUS at 19:16

## 2022-05-31 RX ADMIN — SODIUM CHLORIDE 30 ML/HR: 9 INJECTION, SOLUTION INTRAVENOUS at 11:30

## 2022-05-31 RX ADMIN — FENTANYL CITRATE 150 MCG: 0.05 INJECTION, SOLUTION INTRAMUSCULAR; INTRAVENOUS at 08:28

## 2022-05-31 RX ADMIN — CEFAZOLIN SODIUM 2 G: 2 INJECTION, SOLUTION INTRAVENOUS at 09:50

## 2022-05-31 RX ADMIN — SODIUM CHLORIDE 2 UNITS/HR: 9 INJECTION, SOLUTION INTRAVENOUS at 10:07

## 2022-05-31 RX ADMIN — FENTANYL CITRATE 100 MCG: 0.05 INJECTION, SOLUTION INTRAMUSCULAR; INTRAVENOUS at 08:05

## 2022-05-31 RX ADMIN — Medication 25 MCG/KG/MIN: at 07:40

## 2022-05-31 RX ADMIN — CEFAZOLIN SODIUM 2 G: 2 INJECTION, SOLUTION INTRAVENOUS at 07:37

## 2022-05-31 RX ADMIN — METOCLOPRAMIDE HYDROCHLORIDE 10 MG: 5 INJECTION INTRAMUSCULAR; INTRAVENOUS at 11:37

## 2022-05-31 RX ADMIN — MUPIROCIN 1 APPLICATION: 20 OINTMENT TOPICAL at 06:40

## 2022-05-31 RX ADMIN — PROPOFOL 150 MG: 10 INJECTION, EMULSION INTRAVENOUS at 07:18

## 2022-05-31 RX ADMIN — SODIUM CHLORIDE: 9 INJECTION, SOLUTION INTRAVENOUS at 06:58

## 2022-05-31 RX ADMIN — METOPROLOL TARTRATE 12.5 MG: 25 TABLET, FILM COATED ORAL at 06:40

## 2022-05-31 RX ADMIN — NICARDIPINE HYDROCHLORIDE 5 MG/HR: 2.5 INJECTION, SOLUTION INTRAVENOUS at 09:40

## 2022-05-31 RX ADMIN — METOCLOPRAMIDE HYDROCHLORIDE 10 MG: 5 INJECTION INTRAMUSCULAR; INTRAVENOUS at 18:39

## 2022-05-31 RX ADMIN — ROCURONIUM BROMIDE 50 MG: 50 INJECTION INTRAVENOUS at 08:28

## 2022-05-31 RX ADMIN — ROCURONIUM BROMIDE 80 MG: 50 INJECTION INTRAVENOUS at 07:18

## 2022-05-31 RX ADMIN — CHLORHEXIDINE GLUCONATE 15 ML: 1.2 SOLUTION ORAL at 06:41

## 2022-05-31 RX ADMIN — CEFAZOLIN 2 G: 10 INJECTION, POWDER, FOR SOLUTION INTRAVENOUS at 18:39

## 2022-06-01 ENCOUNTER — APPOINTMENT (OUTPATIENT)
Dept: GENERAL RADIOLOGY | Facility: HOSPITAL | Age: 47
End: 2022-06-01

## 2022-06-01 LAB
ALBUMIN SERPL-MCNC: 4.8 G/DL (ref 3.5–5.2)
ANION GAP SERPL CALCULATED.3IONS-SCNC: 12 MMOL/L (ref 5–15)
BASOPHILS # BLD AUTO: 0.01 10*3/MM3 (ref 0–0.2)
BASOPHILS NFR BLD AUTO: 0.1 % (ref 0–1.5)
BUN SERPL-MCNC: 10 MG/DL (ref 6–20)
BUN/CREAT SERPL: 12.8 (ref 7–25)
CA-I BLD-MCNC: 5 MG/DL (ref 4.6–5.4)
CA-I SERPL ISE-MCNC: 1.25 MMOL/L (ref 1.15–1.35)
CALCIUM SPEC-SCNC: 8.7 MG/DL (ref 8.6–10.5)
CHLORIDE SERPL-SCNC: 106 MMOL/L (ref 98–107)
CO2 SERPL-SCNC: 22 MMOL/L (ref 22–29)
CREAT SERPL-MCNC: 0.78 MG/DL (ref 0.76–1.27)
CYTO UR: NORMAL
DEPRECATED RDW RBC AUTO: 42.7 FL (ref 37–54)
EGFRCR SERPLBLD CKD-EPI 2021: 110 ML/MIN/1.73
EOSINOPHIL # BLD AUTO: 0 10*3/MM3 (ref 0–0.4)
EOSINOPHIL NFR BLD AUTO: 0 % (ref 0.3–6.2)
ERYTHROCYTE [DISTWIDTH] IN BLOOD BY AUTOMATED COUNT: 12.2 % (ref 12.3–15.4)
GLUCOSE BLDC GLUCOMTR-MCNC: 114 MG/DL (ref 70–130)
GLUCOSE BLDC GLUCOMTR-MCNC: 119 MG/DL (ref 70–130)
GLUCOSE BLDC GLUCOMTR-MCNC: 129 MG/DL (ref 70–130)
GLUCOSE BLDC GLUCOMTR-MCNC: 130 MG/DL (ref 70–130)
GLUCOSE BLDC GLUCOMTR-MCNC: 135 MG/DL (ref 70–130)
GLUCOSE BLDC GLUCOMTR-MCNC: 135 MG/DL (ref 70–130)
GLUCOSE BLDC GLUCOMTR-MCNC: 142 MG/DL (ref 70–130)
GLUCOSE BLDC GLUCOMTR-MCNC: 154 MG/DL (ref 70–130)
GLUCOSE SERPL-MCNC: 150 MG/DL (ref 65–99)
HCT VFR BLD AUTO: 35.4 % (ref 37.5–51)
HGB BLD-MCNC: 11.7 G/DL (ref 13–17.7)
IMM GRANULOCYTES # BLD AUTO: 0.04 10*3/MM3 (ref 0–0.05)
IMM GRANULOCYTES NFR BLD AUTO: 0.3 % (ref 0–0.5)
INR PPP: 1.31 (ref 0.9–1.1)
LAB AP CASE REPORT: NORMAL
LAB AP CASE REPORT: NORMAL
LYMPHOCYTES # BLD AUTO: 0.43 10*3/MM3 (ref 0.7–3.1)
LYMPHOCYTES NFR BLD AUTO: 3.7 % (ref 19.6–45.3)
MAGNESIUM SERPL-MCNC: 2.4 MG/DL (ref 1.6–2.6)
MCH RBC QN AUTO: 31.4 PG (ref 26.6–33)
MCHC RBC AUTO-ENTMCNC: 33.1 G/DL (ref 31.5–35.7)
MCV RBC AUTO: 94.9 FL (ref 79–97)
MONOCYTES # BLD AUTO: 0.68 10*3/MM3 (ref 0.1–0.9)
MONOCYTES NFR BLD AUTO: 5.8 % (ref 5–12)
NEUTROPHILS NFR BLD AUTO: 10.61 10*3/MM3 (ref 1.7–7)
NEUTROPHILS NFR BLD AUTO: 90.1 % (ref 42.7–76)
NRBC BLD AUTO-RTO: 0 /100 WBC (ref 0–0.2)
PATH REPORT.FINAL DX SPEC: NORMAL
PATH REPORT.FINAL DX SPEC: NORMAL
PATH REPORT.GROSS SPEC: NORMAL
PATH REPORT.GROSS SPEC: NORMAL
PHOSPHATE SERPL-MCNC: 3.9 MG/DL (ref 2.5–4.5)
PLATELET # BLD AUTO: 152 10*3/MM3 (ref 140–450)
PMV BLD AUTO: 9.9 FL (ref 6–12)
POTASSIUM SERPL-SCNC: 4.4 MMOL/L (ref 3.5–5.2)
PROTHROMBIN TIME: 16.1 SECONDS (ref 11.7–14.2)
QT INTERVAL: 363 MS
RBC # BLD AUTO: 3.73 10*6/MM3 (ref 4.14–5.8)
SODIUM SERPL-SCNC: 140 MMOL/L (ref 136–145)
WBC NRBC COR # BLD: 11.77 10*3/MM3 (ref 3.4–10.8)

## 2022-06-01 PROCEDURE — 25010000002 CEFAZOLIN PER 500 MG: Performed by: NURSE PRACTITIONER

## 2022-06-01 PROCEDURE — 82330 ASSAY OF CALCIUM: CPT | Performed by: NURSE PRACTITIONER

## 2022-06-01 PROCEDURE — 97162 PT EVAL MOD COMPLEX 30 MIN: CPT

## 2022-06-01 PROCEDURE — 97530 THERAPEUTIC ACTIVITIES: CPT

## 2022-06-01 PROCEDURE — 85610 PROTHROMBIN TIME: CPT | Performed by: NURSE PRACTITIONER

## 2022-06-01 PROCEDURE — 25010000002 METOCLOPRAMIDE PER 10 MG: Performed by: NURSE PRACTITIONER

## 2022-06-01 PROCEDURE — 93005 ELECTROCARDIOGRAM TRACING: CPT | Performed by: NURSE PRACTITIONER

## 2022-06-01 PROCEDURE — 25010000002 MAGNESIUM SULFATE IN D5W 1G/100ML (PREMIX) 1-5 GM/100ML-% SOLUTION: Performed by: NURSE PRACTITIONER

## 2022-06-01 PROCEDURE — 25010000002 FUROSEMIDE PER 20 MG: Performed by: NURSE PRACTITIONER

## 2022-06-01 PROCEDURE — 25010000002 ENOXAPARIN PER 10 MG: Performed by: NURSE PRACTITIONER

## 2022-06-01 PROCEDURE — 94761 N-INVAS EAR/PLS OXIMETRY MLT: CPT

## 2022-06-01 PROCEDURE — 82962 GLUCOSE BLOOD TEST: CPT

## 2022-06-01 PROCEDURE — 0 POTASSIUM CHLORIDE PER 2 MEQ: Performed by: NURSE PRACTITIONER

## 2022-06-01 PROCEDURE — 25010000002 ALBUMIN HUMAN 5% PER 50 ML: Performed by: NURSE PRACTITIONER

## 2022-06-01 PROCEDURE — 93010 ELECTROCARDIOGRAM REPORT: CPT | Performed by: INTERNAL MEDICINE

## 2022-06-01 PROCEDURE — 85025 COMPLETE CBC W/AUTO DIFF WBC: CPT | Performed by: NURSE PRACTITIONER

## 2022-06-01 PROCEDURE — 94799 UNLISTED PULMONARY SVC/PX: CPT

## 2022-06-01 PROCEDURE — 71045 X-RAY EXAM CHEST 1 VIEW: CPT

## 2022-06-01 PROCEDURE — 80069 RENAL FUNCTION PANEL: CPT | Performed by: NURSE PRACTITIONER

## 2022-06-01 PROCEDURE — 25010000002 CEFAZOLIN IN DEXTROSE 2-4 GM/100ML-% SOLUTION: Performed by: NURSE PRACTITIONER

## 2022-06-01 PROCEDURE — P9041 ALBUMIN (HUMAN),5%, 50ML: HCPCS | Performed by: NURSE PRACTITIONER

## 2022-06-01 PROCEDURE — 83735 ASSAY OF MAGNESIUM: CPT | Performed by: NURSE PRACTITIONER

## 2022-06-01 PROCEDURE — 25010000002 CALCIUM GLUCONATE-NACL 1-0.675 GM/50ML-% SOLUTION: Performed by: NURSE PRACTITIONER

## 2022-06-01 RX ORDER — NICOTINE POLACRILEX 4 MG
15 LOZENGE BUCCAL
Status: DISCONTINUED | OUTPATIENT
Start: 2022-06-01 | End: 2022-06-05 | Stop reason: HOSPADM

## 2022-06-01 RX ORDER — IBUPROFEN 600 MG/1
600 TABLET ORAL EVERY 4 HOURS PRN
Status: DISCONTINUED | OUTPATIENT
Start: 2022-06-01 | End: 2022-06-05 | Stop reason: HOSPADM

## 2022-06-01 RX ORDER — POTASSIUM CHLORIDE 750 MG/1
20 TABLET, FILM COATED, EXTENDED RELEASE ORAL ONCE
Status: COMPLETED | OUTPATIENT
Start: 2022-06-01 | End: 2022-06-01

## 2022-06-01 RX ORDER — CALCIUM GLUCONATE 20 MG/ML
2 INJECTION, SOLUTION INTRAVENOUS ONCE
Status: COMPLETED | OUTPATIENT
Start: 2022-06-01 | End: 2022-06-01

## 2022-06-01 RX ORDER — DEXTROSE MONOHYDRATE 25 G/50ML
25 INJECTION, SOLUTION INTRAVENOUS
Status: DISCONTINUED | OUTPATIENT
Start: 2022-06-01 | End: 2022-06-05 | Stop reason: HOSPADM

## 2022-06-01 RX ORDER — KETOROLAC TROMETHAMINE 30 MG/ML
30 INJECTION, SOLUTION INTRAMUSCULAR; INTRAVENOUS EVERY 6 HOURS PRN
Status: DISCONTINUED | OUTPATIENT
Start: 2022-06-01 | End: 2022-06-05 | Stop reason: HOSPADM

## 2022-06-01 RX ORDER — FUROSEMIDE 10 MG/ML
20 INJECTION INTRAMUSCULAR; INTRAVENOUS ONCE
Status: COMPLETED | OUTPATIENT
Start: 2022-06-01 | End: 2022-06-01

## 2022-06-01 RX ORDER — INSULIN LISPRO 100 [IU]/ML
0-7 INJECTION, SOLUTION INTRAVENOUS; SUBCUTANEOUS
Status: DISCONTINUED | OUTPATIENT
Start: 2022-06-01 | End: 2022-06-05 | Stop reason: HOSPADM

## 2022-06-01 RX ORDER — GUAIFENESIN 600 MG/1
1200 TABLET, EXTENDED RELEASE ORAL EVERY 12 HOURS SCHEDULED
Status: DISCONTINUED | OUTPATIENT
Start: 2022-06-01 | End: 2022-06-05 | Stop reason: HOSPADM

## 2022-06-01 RX ADMIN — ACETAMINOPHEN 325MG 650 MG: 325 TABLET ORAL at 08:21

## 2022-06-01 RX ADMIN — PANTOPRAZOLE SODIUM 40 MG: 40 TABLET, DELAYED RELEASE ORAL at 06:07

## 2022-06-01 RX ADMIN — OXYCODONE 5 MG: 5 TABLET ORAL at 08:46

## 2022-06-01 RX ADMIN — OXYCODONE 10 MG: 5 TABLET ORAL at 16:22

## 2022-06-01 RX ADMIN — MAGNESIUM SULFATE HEPTAHYDRATE 1 G: 1 INJECTION, SOLUTION INTRAVENOUS at 04:59

## 2022-06-01 RX ADMIN — MUPIROCIN 1 APPLICATION: 20 OINTMENT TOPICAL at 20:49

## 2022-06-01 RX ADMIN — ATORVASTATIN CALCIUM 40 MG: 20 TABLET, FILM COATED ORAL at 20:48

## 2022-06-01 RX ADMIN — FUROSEMIDE 20 MG: 10 INJECTION, SOLUTION INTRAMUSCULAR; INTRAVENOUS at 08:21

## 2022-06-01 RX ADMIN — METOPROLOL TARTRATE 12.5 MG: 25 TABLET, FILM COATED ORAL at 20:48

## 2022-06-01 RX ADMIN — GUAIFENESIN 1200 MG: 600 TABLET, EXTENDED RELEASE ORAL at 20:48

## 2022-06-01 RX ADMIN — OXYCODONE 10 MG: 5 TABLET ORAL at 12:34

## 2022-06-01 RX ADMIN — CEFAZOLIN 2 G: 10 INJECTION, POWDER, FOR SOLUTION INTRAVENOUS at 17:23

## 2022-06-01 RX ADMIN — ACETAMINOPHEN 325MG 650 MG: 325 TABLET ORAL at 16:22

## 2022-06-01 RX ADMIN — CHLORHEXIDINE GLUCONATE 15 ML: 1.2 SOLUTION ORAL at 20:49

## 2022-06-01 RX ADMIN — CEFAZOLIN 2 G: 10 INJECTION, POWDER, FOR SOLUTION INTRAVENOUS at 02:13

## 2022-06-01 RX ADMIN — ASPIRIN 81 MG: 81 TABLET, COATED ORAL at 08:21

## 2022-06-01 RX ADMIN — POTASSIUM CHLORIDE 20 MEQ: 750 TABLET, EXTENDED RELEASE ORAL at 08:21

## 2022-06-01 RX ADMIN — ENOXAPARIN SODIUM 40 MG: 100 INJECTION SUBCUTANEOUS at 17:23

## 2022-06-01 RX ADMIN — METOCLOPRAMIDE HYDROCHLORIDE 10 MG: 5 INJECTION INTRAMUSCULAR; INTRAVENOUS at 05:27

## 2022-06-01 RX ADMIN — OXYCODONE 5 MG: 5 TABLET ORAL at 08:21

## 2022-06-01 RX ADMIN — CALCIUM GLUCONATE 2 G: 20 INJECTION, SOLUTION INTRAVENOUS at 08:21

## 2022-06-01 RX ADMIN — GUAIFENESIN 1200 MG: 600 TABLET, EXTENDED RELEASE ORAL at 08:21

## 2022-06-01 RX ADMIN — CHLORHEXIDINE GLUCONATE 15 ML: 1.2 SOLUTION ORAL at 08:21

## 2022-06-01 RX ADMIN — ACETAMINOPHEN 325MG 650 MG: 325 TABLET ORAL at 12:33

## 2022-06-01 RX ADMIN — METOCLOPRAMIDE HYDROCHLORIDE 10 MG: 5 INJECTION INTRAMUSCULAR; INTRAVENOUS at 00:03

## 2022-06-01 RX ADMIN — CEFAZOLIN 2 G: 10 INJECTION, POWDER, FOR SOLUTION INTRAVENOUS at 10:59

## 2022-06-01 RX ADMIN — ACETAMINOPHEN 325MG 650 MG: 325 TABLET ORAL at 00:03

## 2022-06-01 RX ADMIN — ALBUMIN HUMAN 250 ML: 0.05 INJECTION, SOLUTION INTRAVENOUS at 00:31

## 2022-06-01 RX ADMIN — MUPIROCIN 1 APPLICATION: 20 OINTMENT TOPICAL at 08:21

## 2022-06-01 RX ADMIN — POTASSIUM CHLORIDE 20 MEQ: 29.8 INJECTION, SOLUTION INTRAVENOUS at 00:03

## 2022-06-01 RX ADMIN — DOCUSATE SODIUM 50MG AND SENNOSIDES 8.6MG 2 TABLET: 8.6; 5 TABLET, FILM COATED ORAL at 20:47

## 2022-06-01 RX ADMIN — OXYCODONE 10 MG: 5 TABLET ORAL at 20:49

## 2022-06-01 NOTE — PLAN OF CARE
Goal Outcome Evaluation:  Plan of Care Reviewed With: patient           Outcome Evaluation: 47yo WDWN  male admitted 5/30/22 due to severe aortic valve narrowing. Now s/p replacement 5/31. PMH includes TB and toothache. PLOF: Pt lives at [facility] and was independent with mobility and ADLs including working as a . Pt is limited at this time by post-op weakness and decreased activity tolerance that limit his functional activities. Today, he is sitting UIC in NAD, large bruising ntoed B ant knees from work, c/o incisional chest pain = 5/10. He req CGA of 2 to stand from recliner. Pt amb 200' with CGA of 2 - good pace, balance slightly unsteady, decreased endurance limits further amb distance. He would certainly benefit from skilled PT services acutely to address functional deficits and progress as tolerated to prepare for d/c home.

## 2022-06-01 NOTE — THERAPY EVALUATION
Patient Name: Jorge Luis Godinez  : 1975    MRN: [id number]                              Today's Date: 2022       Admit Date: 2022    Visit Dx:     ICD-10-CM ICD-9-CM   1. S/P AVR (aortic valve replacement)  Z95.2 V43.3   2. Aortic stenosis, severe  I35.0 424.1     Patient Active Problem List   Diagnosis   • Pericardial effusion (noninflammatory)   • Tooth pain   • Severe aortic valve stenosis   • History of tuberculosis   • S/P AVR (aortic valve replacement)     Past Medical History:   Diagnosis Date   • Cardiac murmur    • Hypercholesterolemia    • Hypertension    • Pericardial effusion    • Topical med dermatitis      Past Surgical History:   Procedure Laterality Date   • ASCENDING AORTIC ANEURYSM REPAIR W/ MECHANICAL AORTIC VALVE REPLACEMENT N/A 2022    Procedure: AORTIC VALVE REPLACEMENT, STERNOTOMY, INTRA OP JOSE MANUEL, PRP;  Surgeon: Gm Greenberg MD;  Location: [facility] CVOR;  Service: Cardiothoracic;  Laterality: N/A;   • CARDIAC CATHETERIZATION N/A 2022    Procedure: CORONARY ANGIOGRAPHY;  Surgeon: Lena Corcoran MD;  Location: [facility] CATH INVASIVE LOCATION;  Service: Cardiology;  Laterality: N/A;   • MULTIPLE TOOTH EXTRACTIONS  2022   • SHOULDER CLOSED REDUCTION Right       General Information     Row Name 22 1119          Physical Therapy Time and Intention    Document Type evaluation  -DJ     Mode of Treatment individual therapy;physical therapy  -DJ     Row Name 22 1119          General Information    Patient Profile Reviewed yes  -DJ     Prior Level of Function independent:;ADL's;work;community mobility  -DJ     Existing Precautions/Restrictions fall;cardiac  -DJ     Barriers to Rehab medically complex;language barrier  -DJ     Row Name 22 1119          Home Main Entrance    Number of Stairs, Main Entrance one  -DJ     Row Name 22 1119          Cognition    Orientation Status (Cognition) oriented x 4;other (see comments)  Bengali speaking  but understands some English  -DJ     Row Name 06/01/22 1119          Safety Issues, Functional Mobility    Safety Issues Affecting Function (Mobility) safety precaution awareness  -DJ     Impairments Affecting Function (Mobility) balance;endurance/activity tolerance;strength  -DJ     Comment, Safety Issues/Impairments (Mobility) nonskid socks  -DJ           User Key  (r) = Recorded By, (t) = Taken By, (c) = Cosigned By    Initials Name Provider Type    Vesna Ayala, MELINDA Physical Therapist               Mobility     Row Name 06/01/22 1121          Bed Mobility    Comment, (Bed Mobility) UIC  -DJ     Row Name 06/01/22 1121          Transfers    Comment, (Transfers) sit/stand from recliner  -DJ     Row Name 06/01/22 1121          Bed-Chair Transfer    Bed-Chair [place] (Transfers) not tested  -DJ     Row Name 06/01/22 1121          Sit-Stand Transfer    Sit-Stand [place] (Transfers) contact guard;minimum assist (75% patient effort);2 person assist;1 person to manage equipment;verbal cues  -DJ     Comment, (Sit-Stand Transfer) instructed not to use UE when standing  -DJ     Row Name 06/01/22 1121          Gait/Stairs (Locomotion)    [place] Level (Gait) contact guard;2 person assist;1 person to manage equipment;verbal cues  -DJ     Distance in Feet (Gait) 200'  -DJ     Deviations/Abnormal Patterns (Gait) stride length decreased;antalgic  -DJ     Bilateral Gait Deviations forward flexed posture  -DJ     [place] Level (Stairs) not tested  -DJ     Comment, (Gait/Stairs) Pt amb 200' with CGA of 2 - good pace, balance slightly unsteady, decreased endurance limtis further amb distance  -DJ           User Key  (r) = Recorded By, (t) = Taken By, (c) = Cosigned By    Initials Name Provider Type    Vesna Ayala, PT Physical Therapist               Obj/Interventions     Row Name 06/01/22 1123          Range of Motion Comprehensive    General Range of Motion no range of motion deficits identified  -DJ      Row Name 06/01/22 1123          Strength Comprehensive (MMT)    Comment, General Manual Muscle Testing (MMT) Assessment moves all 4s, fair extremity strength  -DJ     Row Name 06/01/22 1123          Motor Skills    Motor Skills functional endurance  -DJ     Functional Endurance limits further amb distance  -DJ     Row Name 06/01/22 1123          Balance    Balance Assessment standing static balance;standing dynamic balance  -DJ     Static Standing Balance contact guard;verbal cues  -DJ     Dynamic Standing Balance contact guard;2-person assist;verbal cues  -DJ     Balance Interventions sitting;standing;sit to stand;supported;weight shifting activity  -DJ     Comment, Balance fair  -DJ     Row Name 06/01/22 1123          Sensory Assessment (Somatosensory)    Sensory Assessment (Somatosensory) not tested  -DJ           User Key  (r) = Recorded By, (t) = Taken By, (c) = Cosigned By    Initials Name Provider Type    Vesna Ayala, PT Physical Therapist               Goals/Plan     [facility] Name 06/01/22 1133          Bed Mobility Goal 1 (PT)    Activity/Assistive Device (Bed Mobility Goal 1, PT) sit to supine;supine to sit  -DJ     [place] Level/Cues Needed (Bed Mobility Goal 1, PT) standby assist;verbal cues required  -DJ     Time Frame (Bed Mobility Goal 1, PT) 10 days  -DJ     Row Name 06/01/22 1133          Transfer Goal 1 (PT)    Activity/Assistive Device (Transfer Goal 1, PT) sit-to-stand/stand-to-sit  -DJ     [place] Level/Cues Needed (Transfer Goal 1, PT) standby assist;verbal cues required  -DJ     Time Frame (Transfer Goal 1, PT) 10 days  -DJ     Row Name 06/01/22 1133          Gait Training Goal 1 (PT)    Activity/Assistive Device (Gait Training Goal 1, PT) gait (walking locomotion);improve balance and speed;increase endurance/gait distance;increase energy conservation  -DJ     Distance (Gait Training Goal 1, PT) >400'  -DJ     Time Frame (Gait Training Goal 1, PT) 10 days  -DJ     Row Name 06/01/22  1133          Patient Education Goal (PT)    Activity (Patient Education Goal, PT) HEP  -DJ     [place]/Cues/Accuracy (Memory Goal 2, PT) demonstrates adequately;verbalizes understanding  -DJ     Time Frame (Patient Education Goal, PT) 3 days;short term goal (STG)  -DJ     Row Name 06/01/22 1133          Therapy Assessment/Plan (PT)    Planned Therapy Interventions (PT) balance training;bed mobility training;gait training;home exercise program;strengthening;stair training;postural re-education;patient/family education;transfer training  -DJ           User Key  (r) = Recorded By, (t) = Taken By, (c) = Cosigned By    Initials Name Provider Type    Vesna Ayala, PT Physical Therapist               Clinical Impression     Row Name 06/01/22 1127          Pain    Pretreatment Pain Rating 5/10  -DJ     Pain Location incisional  -DJ     Pain Location - chest  -DJ     Row Name 06/01/22 1127          Plan of Care Review    Plan of Care Reviewed With patient  -DJ     Outcome Evaluation 47yo WDWN  male admitted 5/30/22 due to severe aortic valve narrowing. Now s/p replacement 5/31. PMH includes TB and toothache. PLOF: Pt lives at [facility] and was independent with mobility and ADLs including working as a . Pt is limited at this time by post-op weakness and decreased activity tolerance that limit his functional activities. Today, he is sitting UIC in NAD, large bruising ntoed B ant knees from work, c/o incisional chest pain = 5/10. He req CGA of 2 to stand from recliner. Pt amb 200' with CGA of 2 - good pace, balance slightly unsteady, decreased endurance limits further amb distance. He would certainly benefit from skilled PT services acutely to address functional deficits and progress as tolerated to prepare for d/c home.  -DJ     Row Name 06/01/22 1127          Therapy Assessment/Plan (PT)    Patient/Family Therapy Goals Statement (PT) home  -DJ     Rehab Potential (PT) good, to achieve stated therapy goals   -DJ     Criteria for Skilled Interventions Met (PT) yes;meets criteria;skilled treatment is necessary  -DJ     Therapy Frequency (PT) 6 times/wk  -DJ     Row Name 06/01/22 1127          Vital Signs    Pre Patient Position Sitting  -DJ     Intra Patient Position Standing  -DJ     Post Patient Position Sitting  -DJ     Row Name 06/01/22 1127          Positioning and Restraints    Pre-Treatment Position sitting in chair/recliner  -DJ     Post Treatment Position chair  -DJ     In Chair reclined;call light within reach;encouraged to call for assist;with nsg;RUE elevated;LUE elevated  -DJ           User Key  (r) = Recorded By, (t) = Taken By, (c) = Cosigned By    Initials Name Provider Type    Vesna Ayala, MELINDA Physical Therapist               Outcome Measures     Row Name 06/01/22 1134          How much help from another person do you currently need...    Turning from your back to your side while in flat bed without using bedrails? 3  -DJ     Moving from lying on back to sitting on the side of a flat bed without bedrails? 3  -DJ     Moving to and from a bed to a chair (including a wheelchair)? 3  -DJ     Standing up from a chair using your arms (e.g., wheelchair, bedside chair)? 3  -DJ     Climbing 3-5 steps with a railing? 2  -DJ     To walk in hospital room? 3  -DJ     AM-PAC 6 Clicks Score (PT) 17  -DJ     Highest level of mobility 5 --> Static standing  -DJ     Row Name 06/01/22 1134          Functional Assessment    Outcome Measure Options AM-PAC 6 Clicks Basic Mobility (PT)  -DJ           User Key  (r) = Recorded By, (t) = Taken By, (c) = Cosigned By    Initials Name Provider Type    Vesna Ayala, MELINDA Physical Therapist                             Physical Therapy Education                 Title: PT OT SLP Therapies (In Progress)     Topic: Physical Therapy (In Progress)     Point: Mobility training (Done)     Learning Progress Summary           Patient Acceptance E DU by FARAZ at 6/1/2022 1134                    Point: Home exercise program (Not Started)     Learner Progress:  Not documented in this visit.          Point: Body mechanics (Done)     Learning Progress Summary           Patient Acceptance, E, DU by FARAZ at 6/1/2022 1134                   Point: Precautions (Done)     Learning Progress Summary           Patient Acceptance, E, DU by DJ at 6/1/2022 1134                               User Key     Initials Effective Dates Name Provider Type Discipline     10/25/19 -  Vesna Gardiner, PT Physical Therapist PT              PT Recommendation and Plan  Planned Therapy Interventions (PT): balance training, bed mobility training, gait training, home exercise program, strengthening, stair training, postural re-education, patient/family education, transfer training  Plan of Care Reviewed With: patient  Outcome Evaluation: 45yo WDWN  male admitted 5/30/22 due to severe aortic valve narrowing. Now s/p replacement 5/31. PMH includes TB and toothache. PLOF: Pt lives at [facility] and was independent with mobility and ADLs including working as a . Pt is limited at this time by post-op weakness and decreased activity tolerance that limit his functional activities. Today, he is sitting UIC in NAD, large bruising ntoed B ant knees from work, c/o incisional chest pain = 5/10. He req CGA of 2 to stand from recliner. Pt amb 200' with CGA of 2 - good pace, balance slightly unsteady, decreased endurance limits further amb distance. He would certainly benefit from skilled PT services acutely to address functional deficits and progress as tolerated to prepare for d/c home.     Time Calculation:    PT Charges     Row Name 06/01/22 1135             Time Calculation    Start Time 0852  -DJ      Stop Time 0910  -DJ      Time Calculation (min) 18 min  -DJ      PT Non-Billable Time (min) 15 min  -DJ      PT Received On 06/01/22  -DJ      PT - Next Appointment 06/02/22  -DJ      PT Goal Re-Cert Due Date 06/11/22  -DJ            User Key   (r) = Recorded By, (t) = Taken By, (c) = Cosigned By    Initials Name Provider Type    Vesna Ayala, PT Physical Therapist              Therapy Charges for Today     Code Description Service Date Service Provider Modifiers Qty    48290460874 HC PT EVAL MOD COMPLEXITY 2 6/1/2022 Vesna Gardiner, PT GP 1    60067754347 HC PT THERAPEUTIC ACT EA 15 MIN 6/1/2022 Vesna Gardiner, PT GP 1    94257660429 HC PT THER SUPP EA 15 MIN 6/1/2022 Vesna Gardiner, PT GP 1          PT G-Codes  Outcome Measure Options: AM-PAC 6 Clicks Basic Mobility (PT)  AM-PAC 6 Clicks Score (PT): 17    Vesna Gardiner PT  6/1/2022

## 2022-06-01 NOTE — PROGRESS NOTES
LOS: 2 days   Patient Care Team:  Reyna Zayas MD as PCP - General (Internal Medicine)    Chief Complaint: post op    Subjective      Vital Signs  Temp:  [95.9 °F (35.5 °C)-99.32 °F (37.4 °C)] 99 °F (37.2 °C)  Heart Rate:  [80-85] 85  Resp:  [14-22] 19  BP: ()/(61-82) 106/76  Arterial Line BP: ()/() 115/63  FiO2 (%):  [40 %] 40 %  Body mass index is 33.41 kg/m².    Intake/Output Summary (Last 24 hours) at 6/1/2022 0702  Last data filed at 6/1/2022 0600  Gross per 24 hour   Intake 3672.54 ml   Output 3960 ml   Net -287.46 ml     No intake/output data recorded.    Chest tube drainage last 8 hours 210        05/30/22  1605 05/30/22  2319 06/01/22  0600   Weight: 74.6 kg (164 lb 8 oz) 74.8 kg (165 lb) 78.2 kg (172 lb 6.4 oz)         Objective    Results Review:        WBC WBC   Date Value Ref Range Status   06/01/2022 11.77 (H) 3.40 - 10.80 10*3/mm3 Final   05/31/2022 16.10 (H) 3.40 - 10.80 10*3/mm3 Final   05/31/2022 16.26 (H) 3.40 - 10.80 10*3/mm3 Final   05/30/2022 7.27 3.40 - 10.80 10*3/mm3 Final      HGB Hemoglobin   Date Value Ref Range Status   06/01/2022 11.7 (L) 13.0 - 17.7 g/dL Final   05/31/2022 13.1 13.0 - 17.7 g/dL Final   05/31/2022 11.9 (L) 13.0 - 17.7 g/dL Final   05/31/2022 10.9 (L) 12.0 - 17.0 g/dL Final   05/31/2022 11.2 (L) 12.0 - 17.0 g/dL Final   05/31/2022 10.2 (L) 12.0 - 17.0 g/dL Final   05/31/2022 9.9 (L) 12.0 - 17.0 g/dL Final   05/31/2022 13.6 12.0 - 17.0 g/dL Final   05/30/2022 15.9 13.0 - 17.7 g/dL Final      HCT Hematocrit   Date Value Ref Range Status   06/01/2022 35.4 (L) 37.5 - 51.0 % Final   05/31/2022 37.9 37.5 - 51.0 % Final   05/31/2022 36.1 (L) 37.5 - 51.0 % Final   05/31/2022 32 (L) 38 - 51 % Final   05/31/2022 33 (L) 38 - 51 % Final   05/31/2022 30 (L) 38 - 51 % Final   05/31/2022 29 (L) 38 - 51 % Final   05/31/2022 40 38 - 51 % Final   05/30/2022 45.7 37.5 - 51.0 % Final      Platelets Platelets   Date Value Ref Range Status   06/01/2022  152 140 - 450 10*3/mm3 Final   05/31/2022 193 140 - 450 10*3/mm3 Final   05/31/2022 191 140 - 450 10*3/mm3 Final   05/30/2022 343 140 - 450 10*3/mm3 Final        PT/INR:    Protime   Date Value Ref Range Status   06/01/2022 16.1 (H) 11.7 - 14.2 Seconds Final   05/31/2022 18.3 (H) 11.7 - 14.2 Seconds Final   05/30/2022 13.8 11.7 - 14.2 Seconds Final   /  INR   Date Value Ref Range Status   06/01/2022 1.31 (H) 0.90 - 1.10 Final   05/31/2022 1.55 (H) 0.90 - 1.10 Final   05/30/2022 1.07 0.90 - 1.10 Final       Sodium Sodium   Date Value Ref Range Status   06/01/2022 140 136 - 145 mmol/L Final   05/31/2022 141 136 - 145 mmol/L Final   05/31/2022 139 136 - 145 mmol/L Final   05/31/2022 140 136 - 145 mmol/L Final   05/30/2022 141 136 - 145 mmol/L Final      Potassium Potassium   Date Value Ref Range Status   06/01/2022 4.4 3.5 - 5.2 mmol/L Final   05/31/2022 3.9 3.5 - 5.2 mmol/L Final   05/31/2022 4.3 3.5 - 5.2 mmol/L Final   05/31/2022 4.5 3.5 - 5.2 mmol/L Final   05/31/2022 4.4 3.5 - 5.2 mmol/L Final     Comment:     Slight hemolysis detected by analyzer. Results may be affected.   05/30/2022 3.8 3.5 - 5.2 mmol/L Final      Chloride Chloride   Date Value Ref Range Status   06/01/2022 106 98 - 107 mmol/L Final   05/31/2022 108 (H) 98 - 107 mmol/L Final   05/31/2022 105 98 - 107 mmol/L Final   05/31/2022 104 98 - 107 mmol/L Final   05/30/2022 103 98 - 107 mmol/L Final      Bicarbonate CO2   Date Value Ref Range Status   06/01/2022 22.0 22.0 - 29.0 mmol/L Final   05/31/2022 22.5 22.0 - 29.0 mmol/L Final   05/31/2022 22.0 22.0 - 29.0 mmol/L Final   05/31/2022 25.0 22.0 - 29.0 mmol/L Final   05/30/2022 25.0 22.0 - 29.0 mmol/L Final      BUN BUN   Date Value Ref Range Status   06/01/2022 10 6 - 20 mg/dL Final   05/31/2022 8 6 - 20 mg/dL Final   05/31/2022 8 6 - 20 mg/dL Final   05/31/2022 10 6 - 20 mg/dL Final   05/30/2022 12 6 - 20 mg/dL Final      Creatinine Creatinine   Date Value Ref Range Status   06/01/2022 0.78 0.76 -  1.27 mg/dL Final   05/31/2022 0.75 (L) 0.76 - 1.27 mg/dL Final   05/31/2022 0.99 0.76 - 1.27 mg/dL Final   05/31/2022 0.78 0.76 - 1.27 mg/dL Final   05/30/2022 0.89 0.76 - 1.27 mg/dL Final      Calcium Calcium   Date Value Ref Range Status   06/01/2022 8.7 8.6 - 10.5 mg/dL Final   05/31/2022 8.6 8.6 - 10.5 mg/dL Final   05/31/2022 8.9 8.6 - 10.5 mg/dL Final   05/31/2022 8.8 8.6 - 10.5 mg/dL Final   05/30/2022 9.1 8.6 - 10.5 mg/dL Final      Magnesium Magnesium   Date Value Ref Range Status   06/01/2022 2.4 1.6 - 2.6 mg/dL Final   05/31/2022 2.3 1.6 - 2.6 mg/dL Final   05/31/2022 2.2 1.6 - 2.6 mg/dL Final   05/30/2022 2.1 1.6 - 2.6 mg/dL Final          aspirin, 81 mg, Oral, Daily  atorvastatin, 40 mg, Oral, Nightly  ceFAZolin, 2 g, Intravenous, Q8H  chlorhexidine, 15 mL, Mouth/Throat, Q12H  enoxaparin, 40 mg, Subcutaneous, Daily  methadone, 10 mg, Intravenous, Once  metoprolol tartrate, 12.5 mg, Oral, Q12H  mupirocin, , Each Nare, BID  pantoprazole, 40 mg, Oral, QAM  senna-docusate sodium, 2 tablet, Oral, Nightly      clevidipine, 2-32 mg/hr  dexmedetomidine, 0.2-1.5 mcg/kg/hr, Last Rate: Stopped (05/31/22 1622)  DOPamine, 2-20 mcg/kg/min  EPINEPHrine, 0.02-0.2 mcg/kg/min  insulin, 0-100 Units/hr, Last Rate: Stopped (06/01/22 3420)  milrinone, 0.25-0.375 mcg/kg/min  niCARdipine, 5-15 mg/hr  nitroglycerin, 5-200 mcg/min  norepinephrine, 0.02-0.3 mcg/kg/min  phenylephrine, 0.2-2 mcg/kg/min, Last Rate: Stopped (05/31/22 2100)  sodium chloride, 30 mL/hr, Last Rate: 30 mL/hr (05/31/22 1130)            Patient Active Problem List   Diagnosis Code   • Pericardial effusion (noninflammatory) I31.3   • Tooth pain K08.89   • Severe aortic valve stenosis I35.0   • History of tuberculosis Z86.11   • S/P AVR (aortic valve replacement) Z95.2       Assessment & Plan    -Severe aortic stenosis- s/p AVR (tissue)- POD#1 [place]  -post op anemia- expected acute blood loss  -Leukocytosis- probable reactive    Looks good this  morning  Up in the chair- hold IS  4L NC-- wean as able  Sinus rhythm under pacemaker rate 70s  Encourage pulmonary toilet-- continue IS--add flutter valve and mucinex  IV diurese  Mobilize  Transfer to stepdown      ABIDA Sebastian  06/01/22  07:02 EDT

## 2022-06-01 NOTE — PLAN OF CARE
Goal Outcome Evaluation:  Plan of Care Reviewed With: patient        Progress: improving         Patient transferred to CVU this afternoon. Post op day 1 from AVR (tissue) with Dr. Greenberg. A/Ox4. Ambulated greater than 200ft twice today. Up to chair for meals. Tolerating diet. Vitals stable. C/o pain, treated with oxycodone and tylenol. Pacer back up AAI@50. NSR in 60-70s. 2 mediastinal chest tubes, marked at 570 (130 out for my shift) serosanguinous. Doing IS and flutter valve independently. Patient and wife updated at bedside utilizing .

## 2022-06-01 NOTE — PAYOR COMM NOTE
"Myriam Khoury (46 y.o. Male)                           ATTENTION;    INITIAL CLINICALS CASE REF [id number]                           REPLY TO UR DEPT  [phone] OR CALL  KASSI LAMNIKOSN :   -             Date of Birth   1975    Social Security Number       Address   [address] [place] [address]    Home Phone   [phone]    MRN   [id number]       Yarsani   None    Marital Status                               Admission Date   22    Admission Type   Urgent    Admitting Provider   Gm Greenberg MD    Attending Provider   Gm Greenberg MD    Department, Room/Bed   [facility] CARDIO RECOVERY,        Discharge Date       Discharge Disposition       Discharge Destination                               Attending Provider: Gm Greenberg MD    Allergies: Lisinopril    Isolation: None   Infection: COVID (History) (22)   Code Status: CPR   Advance Care Planning Activity    Ht: 153 cm (60.24\")   Wt: 78.2 kg (172 lb 6.4 oz)    Admission Cmt: None   Principal Problem: Severe aortic valve stenosis [I35.0]                 Active Insurance as of 2022     Primary Coverage     Payor Plan Insurance Group Employer/Plan Group    [facility] COMMUNITY PLAN [facility] COMMUNITY PLAN [facility]     Payor Plan Address Payor Plan Phone Number Payor Plan Fax Number Effective Dates    [address]   2022 - None Entered    [facility] [phone]       Subscriber Name Subscriber Birth Date Member ID       MYRIAM KHOURY 1975 [id number]                 Emergency Contacts      (Rel.) Home Phone Work Phone Mobile Phone    Umm Jo (Spouse) [phone] -- [phone]               History & Physical      Gm Greenberg MD at 22 1600              Name: Myriam Khoury ADMIT: 2022   : 1975  PCP: Reyna Zayas MD    MRN: [id number] LOS: 1 days "   AGE/SEX: 46 y.o. male  ROOM: [facility] CVOR/CVOR     Shortness of breath    Subjective   Patient is a 46 y.o. male presents with history of tuberculosis in the past who was seen in follow up for aortic stenosis which was moderate in the past. He had an echocardiogram that showed pericardial effusion and was scheduled for a pericardiocentesis but a repeat echocardiogram showed no much fluid and the aortic stenosis was labeled as severe. He started having some symptoms like fatigue at work        Past Medical History:   Diagnosis Date   • Cardiac murmur    • Hypercholesterolemia    • Hypertension    • Pericardial effusion    • Topical med dermatitis      Past Surgical History:   Procedure Laterality Date   • CARDIAC CATHETERIZATION N/A 05/19/2022    Procedure: CORONARY ANGIOGRAPHY;  Surgeon: Lena Corcoran MD;  Location: [facility] CATH INVASIVE LOCATION;  Service: Cardiology;  Laterality: N/A;   • MULTIPLE TOOTH EXTRACTIONS  05/12/2022   • SHOULDER CLOSED REDUCTION Right      Family History   Problem Relation Age of Onset   • Stroke Mother    • Heart attack Sister      Social History     Tobacco Use   • Smoking status: Never Smoker   • Smokeless tobacco: Never Used   Vaping Use   • Vaping Use: Never used   Substance Use Topics   • Alcohol use: Yes     Comment: WEEKENDS   • Drug use: Never     Medications Prior to Admission   Medication Sig Dispense Refill Last Dose   • amLODIPine (NORVASC) 10 MG tablet Take 10 mg by mouth Daily.   5/30/2022 at Unknown time   • LOSARTAN POTASSIUM PO Take 100 mg by mouth Daily.   5/29/2022 at Unknown time   • simvastatin (ZOCOR) 40 MG tablet Take 40 mg by mouth Every Night.   5/29/2022 at Unknown time     Allergies:  Lisinopril    Review of Systems     Objective    Vital Signs  Temp:  [97.7 °F (36.5 °C)-98.6 °F (37 °C)] 97.7 °F (36.5 °C)  Heart Rate:  [54-70] 54  Resp:  [18] 18  BP: (121-140)/(70-86) 121/70  SpO2:  [96 %-98 %] 96 %  on   ;   Device (Oxygen Therapy): room air  Body mass index  is 31.97 kg/m².    Physical Exam    Results Review:   I reviewed the patient's new clinical results.    Assessment & Plan       Severe aortic valve stenosis      Assessment & Plan    1. Severe bicuspid aortic valve stenosis  2. Pericardial effusion    Patient with severe aortic valve stenosis I would recommend surgical intervention.  Plan for tomorrow biological AVR.    I discussed the patients findings and my recommendations with patient.          Gm Greenberg MD  05/31/22  10:48 EDT    Electronically signed by Gm Greenberg MD at 05/31/22 1050         Oxygen Therapy (last 3 days)     Date/Time SpO2 Device (Oxygen Therapy) Flow (L/min) Oxygen Concentration (%) ETCO2 (mmHg)    06/01/22 0746 96 nasal cannula 4 -- --    06/01/22 0700 95 -- -- -- --    06/01/22 0600 95 nasal cannula 4 -- --    06/01/22 0405 95 -- -- -- --    06/01/22 0400 95 nasal cannula 4 -- --    06/01/22 0300 95 -- -- -- --    06/01/22 0200 96 nasal cannula 4 -- --    06/01/22 0100 95 -- -- -- --    06/01/22 0010 95 -- -- -- --    06/01/22 0000 95 nasal cannula 4 -- --    05/31/22 2300 95 nasal cannula 4 -- --    05/31/22 2200 95 nasal cannula 4 -- --    05/31/22 2100 95 -- -- -- --    05/31/22 2015 96 -- -- -- --    05/31/22 2003 -- nasal cannula 4 -- --    05/31/22 2000 95 nasal cannula 4 -- --    05/31/22 1900 94 -- -- -- --    05/31/22 1846 96 -- -- -- --    05/31/22 1845 96 -- -- -- --    05/31/22 1830 97 -- -- -- --    05/31/22 1815 98 -- -- -- --    05/31/22 1800 97 -- -- -- --    05/31/22 1745 98 -- -- -- --    05/31/22 1730 97 -- -- -- --    05/31/22 1715 97 -- -- -- --    05/31/22 1701 97 -- -- -- --    05/31/22 1700 97 -- -- -- --    05/31/22 1645 97 -- -- -- --    05/31/22 1630 98 -- -- -- --    05/31/22 1615 98 -- -- -- --    05/31/22 1601 99 -- -- -- --    05/31/22 1600 99 -- -- -- --    05/31/22 1545 99 -- -- -- --    05/31/22 1530 99 -- -- -- --    05/31/22 1525 98 nasal cannula -- -- --    05/31/22 1515 98 -- --  -- --    05/31/22 1501 98 -- -- -- --    05/31/22 1500 98 -- -- -- --    05/31/22 1445 98 -- -- -- --    05/31/22 1435 -- ventilator -- -- --    05/31/22 1430 98 -- -- -- --    05/31/22 1415 98 -- -- -- --    05/31/22 1401 98 -- -- -- --    05/31/22 1400 98 -- -- -- --    05/31/22 1345 98 -- -- -- --    05/31/22 1330 98 -- -- -- --    05/31/22 1315 96 -- -- -- --    05/31/22 1301 96 -- -- -- --    05/31/22 1300 96 -- -- -- --    05/31/22 1245 96 -- -- -- --    05/31/22 1230 96 -- -- -- --    05/31/22 1223 97 -- -- -- --    05/31/22 1215 97 -- -- -- --    05/31/22 1200 96 -- -- -- --    05/31/22 1145 100 -- -- -- --    05/31/22 1131 100 -- -- -- --    05/31/22 1130 -- ventilator -- 100 --    05/31/22 1116 100 -- -- -- --    05/31/22 1107 100 -- -- -- --    05/31/22 1105 -- room air -- -- --    05/31/22 0310 96 room air -- -- --    05/31/22 0000 -- room air -- -- --    05/30/22 2000 -- room air -- -- --    05/30/22 1924 97 room air -- -- --    05/30/22 1605 98 room air -- -- --                 Facility-Administered Medications as of 6/1/2022   Medication Dose Route Frequency Provider Last Rate Last Admin   • acetaminophen (TYLENOL) tablet 650 mg  650 mg Oral Q4H PRN Indiana Ball APRN   650 mg at 06/01/22 0821    Or   • acetaminophen (TYLENOL) 160 MG/5ML solution 650 mg  650 mg Oral Q4H PRN Indiana Ball APRN        Or   • acetaminophen (TYLENOL) suppository 650 mg  650 mg Rectal Q4H PRN Indiana Ball APRN       • ALPRAZolam (XANAX) tablet 0.25 mg  0.25 mg Oral Q8H PRN Indiana Ball APRN       • aspirin EC tablet 81 mg  81 mg Oral Daily Indiana Ball APRN   81 mg at 06/01/22 0821   • atorvastatin (LIPITOR) tablet 40 mg  40 mg Oral Nightly Indiana Ball APRN   40 mg at 05/31/22 2003   • bisacodyl (DULCOLAX) EC tablet 10 mg  10 mg Oral Daily PRN Indiana Ball APRN       • bisacodyl (DULCOLAX) suppository 10 mg  10 mg Rectal Daily PRN Indiana Ball APRN       • [COMPLETED] calcium  gluconate 1g/50ml 0.675% NaCl IV SOLN  2 g Intravenous Once Indiana Ball APRN   2 g at 06/01/22 0821   • [COMPLETED] ceFAZolin in dextrose (ANCEF) IVPB solution 2 g  2 g Intravenous On Call to OR Arianna Lee APRN   2 g at 05/31/22 0950   • ceFAZolin in dextrose (ANCEF) IVPB solution 2 g  2 g Intravenous Q8H Indiana Ball APRN   2 g at 06/01/22 0213   • [COMPLETED] chlorhexidine (PERIDEX) 0.12 % solution 15 mL  15 mL Mouth/Throat Q12H Arianna Lee APRN   15 mL at 05/31/22 0641   • chlorhexidine (PERIDEX) 0.12 % solution 15 mL  15 mL Mouth/Throat Q12H Indiana Ball APRN   15 mL at 06/01/22 0821   • cyclobenzaprine (FLEXERIL) tablet 10 mg  10 mg Oral Q8H PRN Indiana Ball APRN       • dextrose (D50W) (25 g/50 mL) IV injection 25 g  25 g Intravenous Q15 Min PRN Indiana Ball APRN       • dextrose (GLUTOSE) oral gel 15 g  15 g Oral Q15 Min PRN Indiana Ball APRN       • Enoxaparin Sodium (LOVENOX) syringe 40 mg  40 mg Subcutaneous Daily Indiana Ball APRN       • [COMPLETED] furosemide (LASIX) injection 20 mg  20 mg Intravenous Once Indiana Ball APRN   20 mg at 06/01/22 0821   • glucagon (human recombinant) (GLUCAGEN DIAGNOSTIC) injection 1 mg  1 mg Intramuscular Q15 Min PRN Indiana Ball APRN       • guaiFENesin (MUCINEX) 12 hr tablet 1,200 mg  1,200 mg Oral Q12H Indiana Ball APRN   1,200 mg at 06/01/22 0821   • HYDROcodone-acetaminophen (NORCO) 5-325 MG per tablet 2 tablet  2 tablet Oral Q4H PRN Indiana Ball APRN       • ibuprofen (ADVIL,MOTRIN) tablet 600 mg  600 mg Oral Q4H PRN Indiana Ball APRN       • insulin lispro (ADMELOG) injection 0-7 Units  0-7 Units Subcutaneous TID AC Indiana Ball APRN       • ketorolac (TORADOL) injection 30 mg  30 mg Intravenous Q6H PRN Indiana Ball APRN       • magnesium hydroxide (MILK OF MAGNESIA) suspension 10 mL  10 mL Oral Daily PRN Indiana Ball APRN       • [COMPLETED] magnesium sulfate in D5W  1g/100mL (PREMIX)  1 g Intravenous Q8H Indiana Ball APRN   1 g at 22 0459   • [] meperidine (DEMEROL) injection 25 mg  25 mg Intravenous Q4H PRN Indiana Ball APRN       • [COMPLETED] metoclopramide (REGLAN) injection 10 mg  10 mg Intravenous Q6H Indiana Ball APRN   10 mg at 22 0527   • [COMPLETED] metoprolol tartrate (LOPRESSOR) tablet 12.5 mg  12.5 mg Oral On Call to OR Arianna Lee APRN   12.5 mg at 22 0640   • metoprolol tartrate (LOPRESSOR) tablet 12.5 mg  12.5 mg Oral Q12H Indiana Ball APRN       • morphine injection 1 mg  1 mg Intravenous Q4H PRN Indiana Ball APRN        And   • naloxone (NARCAN) injection 0.4 mg  0.4 mg Intravenous Q5 Min PRN Indiana Ball APRN       • [COMPLETED] mupirocin (BACTROBAN) 2 % nasal ointment 1 application  1 application Each Nare Q12H Arianna Lee APRN   1 application at 22 0640   • mupirocin (BACTROBAN) 2 % nasal ointment   Each Nare BID Indiana Ball APRN   1 application at 22 0821   • ondansetron (ZOFRAN) injection 4 mg  4 mg Intravenous Q6H PRN Indiana Ball APRN   4 mg at 22 1916   • oxyCODONE (ROXICODONE) immediate release tablet 10 mg  10 mg Oral Q4H PRN Indiana Ball APRN   5 mg at 22 0846   • [COMPLETED] pantoprazole (PROTONIX) injection 40 mg  40 mg Intravenous Once Indiana Ball APRN   40 mg at 22 1132    Followed by   • pantoprazole (PROTONIX) EC tablet 40 mg  40 mg Oral QAM Indiana Ball APRN   40 mg at 22 0607   • polyethylene glycol (MIRALAX) packet 17 g  17 g Oral Daily PRN Indiana Ball APRN       • [COMPLETED] potassium chloride (K-DUR,KLOR-CON) ER tablet 20 mEq  20 mEq Oral Once Indiana Ball APRN   20 mEq at 22 0821   • potassium chloride (K-DUR,KLOR-CON) ER tablet 40 mEq  40 mEq Oral PRN Indiana Ball APRN        Or   • potassium chloride (KLOR-CON) packet 40 mEq  40 mEq Oral PRN Indiana Ball APRN       •  potassium chloride 10 mEq in 100 mL IVPB  10 mEq Intravenous Q1H PRN Indiana Ball APRN        Or   • potassium chloride 10 mEq in 100 mL IVPB  10 mEq Intravenous Q1H PRN Indiana Ball APRN       • [COMPLETED] potassium chloride 20 mEq in 50 mL IVPB  20 mEq Intravenous Q1H PRN Indiana Ball APRN 50 mL/hr at 06/01/22 0003 20 mEq at 06/01/22 0003   • sennosides-docusate (PERICOLACE) 8.6-50 MG per tablet 2 tablet  2 tablet Oral Nightly Indiana Ball APRN       • sodium chloride 0.9 % infusion  15 mL/hr Intravenous Continuous Indiana Ball APRN 15 mL/hr at 06/01/22 0740 15 mL/hr at 06/01/22 0740     Orders (last 72 hrs)      Start     Ordered    06/03/22 0600  Clean Midsternal Incision & Chest Tube Sites With Hibiclens Beginning on POD 3  Daily       05/31/22 1107    06/03/22 0600  XR Chest PA & Lateral  1 Time Imaging         05/31/22 1107    06/02/22 1200  Remove Midsternal Dressing on POD 3. Patient May Shower With Dressing On. If Dressing Becomes Loose or Wet Remove on POD 2  Once         05/31/22 1107    06/02/22 1200  Leave Incisions Open to Air Unless Draining or Edges Are Not Approximated  Continuous         05/31/22 1107    06/02/22 0600  Change Chest Dressing Daily While Intubated  Daily       05/31/22 1107    06/02/22 0600  Incision Care - Cleanse With Normal Saline & 4x4 Gauze Using Sterile Technique  Daily       05/31/22 1107    06/02/22 0600  Chest Tube & Pacing Wire Sites - Cleanse With Normal Saline & 4x4 Gauze Using Sterile Technique  Daily       05/31/22 1107    06/02/22 0600  CBC (No Diff)  Daily       05/31/22 1107    06/02/22 0600  Basic Metabolic Panel  Daily       05/31/22 1107    06/01/22 2100  sennosides-docusate (PERICOLACE) 8.6-50 MG per tablet 2 tablet  Nightly         05/31/22 1107    06/01/22 1800  Enoxaparin Sodium (LOVENOX) syringe 40 mg  Daily         05/31/22 1107    06/01/22 1400  Intake & Output  Every Shift       05/31/22 1107    06/01/22 1200  Vital Signs  Every  4 Hours      Comments: Perform Vitals Less Frequently Only if Patient Stable      05/31/22 1107    06/01/22 1100  POC Glucose TID AC  3 Times Daily Before Meals       06/01/22 0745    06/01/22 0920  Diet Full Liquid; Cardiac  Diet Effective Now         06/01/22 0920    06/01/22 0900  aspirin EC tablet 81 mg  Daily         05/31/22 1107    06/01/22 0900  metoprolol tartrate (LOPRESSOR) tablet 12.5 mg  Every 12 Hours Scheduled         05/31/22 1107    06/01/22 0900  guaiFENesin (MUCINEX) 12 hr tablet 1,200 mg  Every 12 Hours Scheduled         06/01/22 0734    06/01/22 0845  insulin lispro (ADMELOG) injection 0-7 Units  3 Times Daily Before Meals         06/01/22 0745    06/01/22 0830  Oscillating Positive Expiratory Pressure (OPEP)  4 Times Daily - RT       06/01/22 0734    06/01/22 0830  calcium gluconate 1g/50ml 0.675% NaCl IV SOLN  Once         06/01/22 0734    06/01/22 0830  furosemide (LASIX) injection 20 mg  Once         06/01/22 0734    06/01/22 0830  potassium chloride (K-DUR,KLOR-CON) ER tablet 20 mEq  Once         06/01/22 0734    06/01/22 0810  POC Glucose Once  PROCEDURE ONCE         06/01/22 0809    06/01/22 0800  Wean & Extubate Per Rapid Wean and Extubation Protocol  Every Morning,   Status:  Canceled       05/31/22 1107    06/01/22 0746  Transfer Patient  Once         06/01/22 0746    06/01/22 0745  Do NOT Hold Basal or Correction Scale Insulin When Patient is NPO, Hold Scheduled Mealtime (Bolus) Insulin if NPO  Continuous         06/01/22 0745    06/01/22 0745  Follow [facility] Hypoglycemia Standing Orders For Blood Glucose Less Than 70 mg/dL  Until Discontinued            06/01/22 0745    06/01/22 0744  dextrose (GLUTOSE) oral gel 15 g  Every 15 Minutes PRN         06/01/22 0745    06/01/22 0744  dextrose (D50W) (25 g/50 mL) IV injection 25 g  Every 15 Minutes PRN         06/01/22 0745    06/01/22 0744  glucagon (human recombinant) (GLUCAGEN DIAGNOSTIC) injection 1 mg  Every 15 Minutes PRN         06/01/22  "0745    06/01/22 0739  Diet Clear Liquid; Cardiac  Diet Effective Now,   Status:  Canceled         06/01/22 0739    06/01/22 0733  ibuprofen (ADVIL,MOTRIN) tablet 600 mg  Every 4 Hours PRN         06/01/22 0734    06/01/22 0733  ketorolac (TORADOL) injection 30 mg  Every 6 Hours PRN         06/01/22 0734    06/01/22 0700  pantoprazole (PROTONIX) EC tablet 40 mg  Every Morning        \"Followed by\" Linked Group Details    05/31/22 1107    06/01/22 0600  XR Chest 1 View  Daily       05/31/22 1107    06/01/22 0600  ECG 12 Lead  Daily       05/31/22 1107    06/01/22 0600  RN to Place Order For STAT Chest X-Ray When Chest Tubes Are Removed  Once        Comments: No chest X-ray needed for chest tubes with bulb drain    05/31/22 1107    06/01/22 0450  POC Glucose Once  PROCEDURE ONCE         06/01/22 0448    06/01/22 0308  acetaminophen (TYLENOL) tablet 650 mg  Every 4 Hours PRN        \"Or\" Linked Group Details    05/31/22 1107    06/01/22 0308  acetaminophen (TYLENOL) 160 MG/5ML solution 650 mg  Every 4 Hours PRN        \"Or\" Linked Group Details    05/31/22 1107    06/01/22 0308  acetaminophen (TYLENOL) suppository 650 mg  Every 4 Hours PRN        \"Or\" Linked Group Details    05/31/22 1107    06/01/22 0302  POC Glucose Once  PROCEDURE ONCE         06/01/22 0300    06/01/22 0300  Calcium, Ionized  Once         05/31/22 1107    06/01/22 0300  CBC & Differential  Once         05/31/22 1107    06/01/22 0300  Renal Function Panel  Once         05/31/22 1107    06/01/22 0300  Magnesium  Once         05/31/22 1107    06/01/22 0300  Protime-INR  Once         05/31/22 1107    06/01/22 0300  CBC Auto Differential  PROCEDURE ONCE         05/31/22 1901    06/01/22 0206  POC Glucose Once  PROCEDURE ONCE         06/01/22 0202    06/01/22 0110  POC Glucose Once  PROCEDURE ONCE         06/01/22 0108    06/01/22 0000  bisacodyl (DULCOLAX) suppository 10 mg  Daily PRN         05/31/22 1107    06/01/22 0000  magnesium hydroxide (MILK OF " MAGNESIA) suspension 10 mL  Daily PRN         05/31/22 1107    06/01/22 0000  cyclobenzaprine (FLEXERIL) tablet 10 mg  Every 8 Hours PRN         05/31/22 1107    05/31/22 2330  Patient May Use Home CPAP / BIPAP For Sleep  At Bedtime - RT       05/31/22 1107    05/31/22 2300  POC Glucose Once  PROCEDURE ONCE         05/31/22 2258    05/31/22 2206  POC Glucose Once  PROCEDURE ONCE         05/31/22 2204    05/31/22 2100  atorvastatin (LIPITOR) tablet 40 mg  Nightly         05/31/22 1107    05/31/22 2100  chlorhexidine (PERIDEX) 0.12 % solution 15 mL  Every 12 Hours         05/31/22 1552    05/31/22 2100  POC Glucose Once  PROCEDURE ONCE         05/31/22 2057    05/31/22 1937  POC Glucose Once  PROCEDURE ONCE         05/31/22 1935    05/31/22 1823  POC Glucose Once  PROCEDURE ONCE         05/31/22 1820    05/31/22 1800  ceFAZolin in dextrose (ANCEF) IVPB solution 2 g  Every 8 Hours         05/31/22 1107    05/31/22 1725  POC Glucose Once  PROCEDURE ONCE         05/31/22 1723    05/31/22 1647  Potassium  Timed         05/31/22 1647    05/31/22 1621  POC Glucose Once  PROCEDURE ONCE         05/31/22 1618    05/31/22 1521  Blood Gas, Arterial -  PROCEDURE ONCE         05/31/22 1518    05/31/22 1516  POC Glucose Once  PROCEDURE ONCE         05/31/22 1513    05/31/22 1449  Inpatient Admission  Once         05/31/22 1448    05/31/22 1355  POC Glucose Once  PROCEDURE ONCE         05/31/22 1353    05/31/22 1300  Ambulate Patient  3 Times Daily         05/31/22 1107    05/31/22 1228  POC Glucose Once  PROCEDURE ONCE         05/31/22 1226    05/31/22 1200  Vital Signs Every Hour Until 24 Hours Post Op  Every Hour      Comments: Perform Vitals Less Frequently Only if Patient Stable      05/31/22 1107    05/31/22 1200  Check Peripheral Pulses Every 1 Hour x4  Every Hour,   Status:  Canceled       05/31/22 1107    05/31/22 1200  Check Peripheral Pulses Every 4 Hours  Every 4 Hours       05/31/22 1107    05/31/22 1200  Intake &  Output Every Hour Until 24 Hours Post Op  Every Hour       05/31/22 1107    05/31/22 1200  Cardiac Output Parameters On Admission, Then Every 1 Hour x4  Every Hour,   Status:  Canceled       05/31/22 1107    05/31/22 1200  Cardiac Output Parameters Every 2 Hours Until 24 Hours Post Op  Every 2 Hours,   Status:  Canceled       05/31/22 1107    05/31/22 1200  CBC (No Diff)  Every 4 Hours       05/31/22 1107    05/31/22 1200  sodium chloride 0.9 % infusion  Continuous         05/31/22 1107    05/31/22 1200  nitroglycerin (TRIDIL) 200 mcg/ml infusion  Titrated,   Status:  Discontinued         05/31/22 1107    05/31/22 1200  mupirocin (BACTROBAN) 2 % nasal ointment  2 Times Daily         05/31/22 1107    05/31/22 1200  chlorhexidine (PERIDEX) 0.12 % solution 15 mL  Every 12 Hours,   Status:  Discontinued         05/31/22 1107    05/31/22 1200  insulin regular 1 unit/mL in 0.9% sodium chloride  Titrated,   Status:  Discontinued        Note to Pharmacy: Upon initiation of Glucommander insulin drip, make sure all other insulin orders and anti-diabetic medications have been discontinued.    05/31/22 1107    05/31/22 1200  dexmedetomidine (PRECEDEX) 400 mcg in 100 mL D5W infusion  Titrated,   Status:  Discontinued         05/31/22 1107    05/31/22 1200  magnesium sulfate in D5W 1g/100mL (PREMIX)  Every 8 Hours         05/31/22 1107    05/31/22 1200  metoclopramide (REGLAN) injection 10 mg  Every 6 Hours         05/31/22 1107    05/31/22 1156  Blood Gas, Arterial -  PROCEDURE ONCE         05/31/22 1145    05/31/22 1128  POC Glucose Once  PROCEDURE ONCE         05/31/22 1124    05/31/22 1109  Daily Weights  Daily       05/31/22 1107    05/31/22 1104  Vital Signs & Post-Op Checks Every 15 Minutes x2, Every 30 Minutes x4  Per Hospital Policy,   Status:  Canceled        Comments: Perform Vitals Less Frequently Only if Patient Stable    05/31/22 1107    05/31/22 1104  Intake & Output Every 15 Minutes x2, Every 30 Minutes x4  Every  "Shift,   Status:  Canceled         05/31/22 1107    05/31/22 1104  Cardiac Monitoring  Continuous         05/31/22 1107    05/31/22 1104  Continuous Pulse Oximetry  Continuous         05/31/22 1107    05/31/22 1104  Discontinue NG After Extubation  Once         05/31/22 1107    05/31/22 1104  Continue Indwelling Urinary Catheter  Once        \"And\" Linked Group Details    05/31/22 1107    05/31/22 1104  Assess Need for Indwelling Urinary Catheter - Follow Removal Protocol  Continuous           \"And\" Linked Group Details    05/31/22 1107    05/31/22 1104  Chest & Mediastinal Tubes to 20cm Continuous Suction  Once         05/31/22 1107    05/31/22 1104  Begin Rewarming with Thermal Unit As Needed For Temp Less Than 96F  Once         05/31/22 1107    05/31/22 1104  ACE Wraps to Vein [place] [facility] Site for 24 Hours, Then Apply LISA Hose  Continuous,   Status:  Canceled         05/31/22 1107    05/31/22 1104  Heart Hugger Vest  Continuous         05/31/22 1107    05/31/22 1104  Monitor QTc  Every Shift       05/31/22 1107    05/31/22 1104  Notify Provider - QTc 500 milliseconds or Greater  Until Discontinued         05/31/22 1107    05/31/22 1104  Keep Chest Incisions Covered Until Extubated  Continuous         05/31/22 1107    05/31/22 1104  If Chest Dressing Removed During Chest Tube Removal, Clean Incision With Normal Saline & Redress Until 48 Hours Post Op  Continuous         05/31/22 1107    05/31/22 1104  Notify Surgeon if Drainage From Surgical Incision Site  Until Discontinued         05/31/22 1107    05/31/22 1104  ISOLATE PACER WIRES  Continuous,   Status:  Canceled         05/31/22 1107    05/31/22 1104  Turn Patient Every 2 Hours or Begin Bed Rotation Once Hemodynamically Stable  Now Then Every 2 Hours,   Status:  Canceled         05/31/22 1107    05/31/22 1104  Advance PROM to AROM  Now Then Every 4 Hours,   Status:  Canceled         05/31/22 1107    05/31/22 1104  Up in Chair for Meals  Until Discontinued     "     05/31/22 1107    05/31/22 1104  Notify Provider - Urine Output  Until Discontinued         05/31/22 1107    05/31/22 1104  Notify Provider - Chest Tube Output  Until Discontinued         05/31/22 1107    05/31/22 1104  Initial Ventilator Settings Per Pulmonologist / Anesthesiologist  Once         05/31/22 1107    05/31/22 1104  Incentive Spirometry  Every Hour While Awake       05/31/22 1107    05/31/22 1104  EZ-Pap  Once         05/31/22 1107    05/31/22 1104  Wean & Extubate Per Rapid Wean & Extubate Protocol  Start Now,   Status:  Canceled         05/31/22 1107    05/31/22 1104  XR Chest 1 View  1 Time Imaging         05/31/22 1107    05/31/22 1104  ECG 12 Lead  STAT         05/31/22 1107    05/31/22 1104  Protime-INR  STAT         05/31/22 1107    05/31/22 1104  aPTT  STAT         05/31/22 1107    05/31/22 1104  Calcium, Ionized  STAT         05/31/22 1107    05/31/22 1104  Blood Gas, Arterial -  STAT         05/31/22 1107    05/31/22 1104  CBC & Differential  STAT         05/31/22 1107    05/31/22 1104  Fibrinogen  STAT         05/31/22 1107    05/31/22 1104  Renal Function Panel  Now Then Every 4 Hours       05/31/22 1107    05/31/22 1104  Magnesium  Now Then Every 4 Hours       05/31/22 1107    05/31/22 1104  Potassium  Now Then Every 4 Hours       05/31/22 1107    05/31/22 1104  NPO Diet NPO Type: Sips with Meds  Diet Effective Now,   Status:  Canceled         05/31/22 1107    05/31/22 1104  Advance Diet as Tolerated  Until Discontinued         05/31/22 1107    05/31/22 1104  Cardiac Rehab Evaluation and Enrollment  Once        Provider:  (Not yet assigned)    05/31/22 1107    05/31/22 1104  Consult to Case Management /   Once        Provider:  (Not yet assigned)    05/31/22 1107    05/31/22 1104  PT Consult: Eval & Treat  Once         05/31/22 1107    05/31/22 1104  Treat for hypoglycemia as recommended by the Glucommander™.  Every Shift,   Status:  Canceled      Comments: Treat for  hypoglycemia as recommended by the Glucommander™. Follow Glucommander recommendations for oral or IV hypoglycemia treatment, if IV access is not available administer glucagon or oral treatment per hypoglycemia protocol.    05/31/22 1107 05/31/22 1104  If insulin infusion is paused, follow Glucommander instructions.  Every Shift,   Status:  Canceled      Comments: If insulin infusion is paused, follow Glucommander instructions.    05/31/22 1107 05/31/22 1104  RN to Release PRN POC Glucose orders as per Glucommander  Continuous,   Status:  Canceled        Comments: Glucommander recommended POC testing will vary between 15 minutes and 2 hours. Release PRN POC Glucose orders as needed.    05/31/22 1107 05/31/22 1104  RN to Order STAT Glucose for BG less than 10 mg/dl or greater than 600 mg/dl  Continuous,   Status:  Canceled        Comments: If blood glucose reading is less than 10 mg/dl or greater than 600 mg/dl, obtain STAT glucose by Lab. Do not delay treatment of unstable patient in order to obtain glucose sample from Lab. Inform physician of results.    05/31/22 1107 05/31/22 1104  Prior to initiating the Glucommander™, ensure all prior insulin orders are discontinued.  Once,   Status:  Canceled        Comments: Prior to initiating the Glucommander™, ensure all prior insulin orders are discontinued.    05/31/22 1107 05/31/22 1104  PRIOR to START of Intravenous Insulin Infusion, Notify Provider if K+ is Less Than 3.3, for Extra Potassium Orders, if Indicated. Do Not Start Insulin Drip if K+ Less Than 3.3.  Per Order Details,   Status:  Canceled         05/31/22 1107    05/31/22 1104  Use a Dedicated Line for Insulin Infusion (If Possible).  May Use a Carrier Fluid of NS at KVO Rate if Insulin Rate is Insufficient to Maintain IV Patency.  Prime IV Line With Insulin Infusion  Continuous,   Status:  Canceled         05/31/22 1107 05/31/22 1104  If insulin infusion is discontinued, Glucommander must  "also be discontinued. If the insulin infusion is re-ordered, you must discontinue previous settings in Glucommander and start new.  Per Order Details,   Status:  Canceled        Comments: If insulin infusion is discontinued, Glucommander must also be discontinued. If the insulin infusion is re-ordered, you must discontinue previous settings in Glucommander and start new.    05/31/22 1107    05/31/22 1104  Notify Provider  Until Discontinued,   Status:  Canceled         05/31/22 1107    05/31/22 1104  If patient is being fed a diet or bolus tube feeds, utilize the start meal feature / meal bolus feature in Glucommander.  Continuous,   Status:  Canceled        Comments: If patient is being fed a diet or bolus tube feeds, utilize the start meal feature / meal bolus feature in Glucommander.    05/31/22 1107    05/31/22 1104  CBC Auto Differential  PROCEDURE ONCE         05/31/22 1108    05/31/22 1103  Urinary Catheter Care  Every Shift      \"And\" Linked Group Details    05/31/22 1107    05/31/22 1103  acetaminophen (TYLENOL) tablet 650 mg  Every 4 Hours,   Status:  Discontinued        \"Or\" Linked Group Details    05/31/22 1107    05/31/22 1103  acetaminophen (TYLENOL) 160 MG/5ML solution 650 mg  Every 4 Hours,   Status:  Discontinued        \"Or\" Linked Group Details    05/31/22 1107    05/31/22 1103  acetaminophen (TYLENOL) suppository 650 mg  Every 4 Hours,   Status:  Discontinued        \"Or\" Linked Group Details    05/31/22 1107    05/31/22 1103  morphine injection 1 mg  Every 4 Hours PRN        \"And\" Linked Group Details    05/31/22 1107    05/31/22 1103  naloxone (NARCAN) injection 0.4 mg  Every 5 Minutes PRN        \"And\" Linked Group Details    05/31/22 1107    05/31/22 1103  albumin human 5 % solution 1,500 mL  As Needed,   Status:  Discontinued         05/31/22 1107    05/31/22 1103  propofol (DIPRIVAN) infusion 10 mg/mL 100 mL  Continuous PRN,   Status:  Discontinued         05/31/22 1107    05/31/22 1103  " "meperidine (DEMEROL) injection 25 mg  Every 4 Hours PRN         05/31/22 1107    05/31/22 1103  potassium chloride (K-DUR,KLOR-CON) ER tablet 40 mEq  As Needed        \"Or\" Linked Group Details    05/31/22 1107    05/31/22 1103  potassium chloride (KLOR-CON) packet 40 mEq  As Needed        \"Or\" Linked Group Details    05/31/22 1107    05/31/22 1103  potassium chloride 10 mEq in 100 mL IVPB  Every 1 Hour PRN        \"Or\" Linked Group Details    05/31/22 1107    05/31/22 1103  potassium chloride 10 mEq in 100 mL IVPB  Every 1 Hour PRN        \"Or\" Linked Group Details    05/31/22 1107    05/31/22 1103  potassium chloride 20 mEq in 50 mL IVPB  Every 1 Hour PRN,   Status:  Discontinued         05/31/22 1107    05/31/22 1103  potassium chloride 20 mEq in 50 mL IVPB  Every 1 Hour PRN,   Status:  Discontinued         05/31/22 1107    05/31/22 1103  potassium chloride 20 mEq in 50 mL IVPB  Every 1 Hour PRN         05/31/22 1107    05/31/22 1103  bisacodyl (DULCOLAX) EC tablet 10 mg  Daily PRN         05/31/22 1107    05/31/22 1103  polyethylene glycol (MIRALAX) packet 17 g  Daily PRN         05/31/22 1107    05/31/22 1103  ondansetron (ZOFRAN) injection 4 mg  Every 6 Hours PRN         05/31/22 1107    05/31/22 1103  midazolam (VERSED) injection 2 mg  Every 1 Hour PRN,   Status:  Discontinued         05/31/22 1107    05/31/22 1103  ALPRAZolam (XANAX) tablet 0.25 mg  Every 8 Hours PRN         05/31/22 1107    05/31/22 1103  pantoprazole (PROTONIX) injection 40 mg  Once        \"Followed by\" Linked Group Details    05/31/22 1107    05/31/22 1103  DOPamine 400 mg in 250 mL D5W infusion  Continuous PRN,   Status:  Discontinued         05/31/22 1107    05/31/22 1103  phenylephrine (ZOFIA-SYNEPHRINE) 50 mg in 250 mL NS infusion  Continuous PRN,   Status:  Discontinued         05/31/22 1107    05/31/22 1103  niCARdipine (CARDENE) 25 mg in 250 mL NS infusion kit  Continuous PRN,   Status:  Discontinued         05/31/22 1107    05/31/22 " 1103  norepinephrine (LEVOPHED) 8 mg in 250 mL NS infusion (premix)  Continuous PRN,   Status:  Discontinued         05/31/22 1107    05/31/22 1103  clevidipine (CLEVIPREX) infusion 0.5 mg/mL  Continuous PRN,   Status:  Discontinued         05/31/22 1107    05/31/22 1103  EPINEPHrine 5 mg in 250 mL NS infusion  Continuous PRN,   Status:  Discontinued         05/31/22 1107    05/31/22 1103  milrinone (PRIMACOR) 20 mg in 100 mL D5W infusion  Continuous PRN,   Status:  Discontinued         05/31/22 1107    05/31/22 1103  HYDROcodone-acetaminophen (NORCO) 5-325 MG per tablet 2 tablet  Every 4 Hours PRN         05/31/22 1107    05/31/22 1103  oxyCODONE (ROXICODONE) immediate release tablet 10 mg  Every 4 Hours PRN         05/31/22 1107    05/31/22 1103  morphine injection 4 mg  Every 30 Minutes PRN,   Status:  Discontinued         05/31/22 1107    05/31/22 1103  dextrose (GLUTOSE) oral gel 15 g  Every 15 Minutes PRN,   Status:  Discontinued         05/31/22 1107    05/31/22 1103  dextrose (D50W) (25 g/50 mL) IV injection 10-50 mL  Every 15 Minutes PRN,   Status:  Discontinued         05/31/22 1107    05/31/22 1103  glucagon (human recombinant) (GLUCAGEN DIAGNOSTIC) injection 1 mg  Every 15 Minutes PRN,   Status:  Discontinued         05/31/22 1107    05/31/22 1103  POC Glucose PRN  As Needed,   Status:  Canceled      Comments: Glucommander recommended POC testing will vary between 15 minutes and 2 hours. Release PRN POC Glucose orders as needed.      05/31/22 1107    05/31/22 1103  Cardiac Output Parameters PRN Until 24 Hours Post-Op  As Needed,   Status:  Canceled       05/31/22 1107    05/31/22 1103  Dangle at Bedside After Extubation  As Needed,   Status:  Canceled       05/31/22 1107    05/31/22 1103  Insert Indwelling Urinary Catheter  Once         05/31/22 1102    05/31/22 1103  Assess Need for Indwelling Urinary Catheter - Follow Removal Protocol  Continuous,   Status:  Canceled              05/31/22 1102     05/31/22 1103  Urinary Catheter Care  Every Shift,   Status:  Canceled       05/31/22 1102    05/31/22 1013  POC OR Panel, ISTAT  PROCEDURE ONCE         05/31/22 0913    05/31/22 1013  POC Activated Clotting Time  PROCEDURE ONCE         05/31/22 1000    05/31/22 1013  POC OR Panel, ISTAT  PROCEDURE ONCE         05/31/22 1001    05/31/22 1013  POC Activated Clotting Time  PROCEDURE ONCE         05/31/22 0912    05/31/22 1012  POC OR Panel, ISTAT  PROCEDURE ONCE         05/31/22 0846    05/31/22 1012  POC Activated Clotting Time  PROCEDURE ONCE         05/31/22 0746    05/31/22 1012  POC OR Panel, ISTAT  PROCEDURE ONCE         05/31/22 0747    05/31/22 1012  POC Activated Clotting Time  PROCEDURE ONCE         05/31/22 0814    05/31/22 1012  POC Activated Clotting Time  PROCEDURE ONCE         05/31/22 0841    05/31/22 1012  POC Activated Clotting Time  PROCEDURE ONCE,   Status:  Canceled         05/31/22 1012    05/31/22 1011  POC OR Panel, ISTAT  PROCEDURE ONCE         05/31/22 0841    05/31/22 0945  methadone (DOLOPHINE) injection 10 mg  Once,   Status:  Discontinued         05/31/22 0932    05/31/22 0941  POC Activated Clotting Time  PROCEDURE ONCE,   Status:  Canceled         05/31/22 1011    05/31/22 0938  Tissue Pathology Exam  RELEASE UPON ORDERING         05/31/22 0938    05/31/22 0914  POC Activated Clotting Time  PROCEDURE ONCE,   Status:  Canceled         05/31/22 1011    05/31/22 0806  Body Fluid Culture - Body Fluid, Heart  RELEASE UPON ORDERING         05/31/22 0806    05/31/22 0806  Non-gynecologic Cytology  RELEASE UPON ORDERING         05/31/22 0806    05/31/22 0806  Anaerobic Culture - Body Fluid, Heart  RELEASE UPON ORDERING         05/31/22 0806    05/31/22 0710  gelatin absorbable (GELFOAM) sponge  As Needed,   Status:  Discontinued         05/31/22 0710    05/31/22 0708  Transfer Patient  Once         05/31/22 0711    05/31/22 0706  OR Blood Pickup  Once,   Status:  Canceled         05/31/22 0705     05/31/22 0600  ceFAZolin in dextrose (ANCEF) IVPB solution 2 g  On Call to O.R.         05/30/22 1618    05/31/22 0600  metoprolol tartrate (LOPRESSOR) tablet 12.5 mg  On Call to O.R.         05/30/22 1618    05/31/22 0600  Basic Metabolic Panel  Morning Draw         05/30/22 1618    05/31/22 0418  POC Glucose Once  PROCEDURE ONCE         05/31/22 0415    05/31/22 0001  NPO Diet NPO Type: Sips with meds  Diet Effective Midnight,   Status:  Canceled         05/30/22 1618    05/31/22 0000  Strict Intake & Output  Every 8 Hours,   Status:  Canceled         05/30/22 1618 05/31/22 0000  Clip Hair Chin to Ankles  Once,   Status:  Canceled         05/30/22 1618 05/31/22 0000  Ambulatory Referral to Cardiac Rehab         05/31/22 1250    05/30/22 2100  Weigh Patient Night Before Surgery  Once,   Status:  Canceled        Comments: Need Actual Weight, Not Stated Weight    05/30/22 1618    05/30/22 2100  chlorhexidine (PERIDEX) 0.12 % solution 15 mL  Every 12 Hours Scheduled         05/30/22 1618    05/30/22 2100  Chlorhexidine Gluconate Cloth 2 % pads 1 application  Every 12 Hours,   Status:  Discontinued         05/30/22 1618    05/30/22 2100  mupirocin (BACTROBAN) 2 % nasal ointment 1 application  Every 12 Hours Scheduled         05/30/22 1618 05/30/22 2100  sodium chloride 0.9 % flush 10 mL  Every 12 Hours Scheduled,   Status:  Discontinued         05/30/22 1618    05/30/22 2017  POC Glucose Once  PROCEDURE ONCE         05/30/22 2015 05/30/22 2000  Neurovascular Checks  Every 4 Hours,   Status:  Canceled       05/30/22 1618    05/30/22 2000  Vital Signs  Every 4 Hours,   Status:  Canceled       05/30/22 1618 05/30/22 1927  Blood Gas, Arterial -  PROCEDURE ONCE         05/30/22 1924    05/30/22 1901  ABO RH Specimen Verification  STAT         05/30/22 1900    05/30/22 1801  Urine Culture - Urine, Urine, Clean Catch  Once         05/30/22 1800    05/30/22 1800  Instruct Patient on Coughing, Deep Breathing  and Incentive Spirometry  Every 4 Hours While Awake,   Status:  Canceled       05/30/22 1618    05/30/22 1626  ECG 12 Lead  Once         05/30/22 1625    05/30/22 1619  Measure Height  Once,   Status:  Canceled         05/30/22 1618    05/30/22 1619  Skin Assessment  Every Shift,   Status:  Canceled       05/30/22 1618    05/30/22 1619  POC Glucose Once  Once,   Status:  Canceled        Comments: Obtain glucose at 0400 the day of surgery, if greater than 200, initiate insulin IV protocol      05/30/22 1618    05/30/22 1619  Give Patient Pre-Op Education Booklet / Materials  Once,   Status:  Canceled        Comments: Give Patient Pre-Op Education Booklet / Materials    05/30/22 1618    05/30/22 1619  Obtain Informed Consent  Once,   Status:  Canceled         05/30/22 1618    05/30/22 1619  Evaluation For Pre-Op PFT Need  Once,   Status:  Canceled         05/30/22 1618    05/30/22 1619  Notify Surgeon if Patient Currently Taking Metformin, Warfarin, Plavix, Effient, Ticlid, Brillinta, Pradaxa, Xarelto, Eliquis, Savaysa, Coumadin, Pletal, or  Any Other Antiplatelet / Anticoagulant  Once,   Status:  Canceled         05/30/22 1618    05/30/22 1619  RN To Place Hospitalist & Diabetes Educator Consult Orders if Patient Diabetic, POC Glucose is Greater Than 180 or HgbA1c Greater Than 7  Continuous,   Status:  Canceled        Comments: Hospitalist Reason for Consult - Blood Glucose Management  Diabetes Educator Reason for Consult - Diabetes Education    05/30/22 1618 05/30/22 1619  Inpatient Anesthesiology Consult  Once,   Status:  Canceled        Specialty:  Anesthesiology  Provider:  (Not yet assigned)    05/30/22 1618    05/30/22 1619  CBC & Differential  Once         05/30/22 1618    05/30/22 1619  Comprehensive Metabolic Panel  Once         05/30/22 1618    05/30/22 1619  Magnesium  Once         05/30/22 1618    05/30/22 1619  Hemoglobin A1c  Once         05/30/22 1618    05/30/22 1619  BNP  Once         05/30/22  1618 05/30/22 1619  Lipid Panel  Once         05/30/22 1618    05/30/22 1619  aPTT  Once         05/30/22 1618    05/30/22 1619  Protime-INR  Once         05/30/22 1618 05/30/22 1619  Platelet Function ADP  Once         05/30/22 1618 05/30/22 1619  Urinalysis With Culture If Indicated - Urine, Clean Catch  Once         05/30/22 1618 05/30/22 1619  Blood Gas, Arterial -  Once,   Status:  Canceled         05/30/22 1618    05/30/22 1619  Type & Screen  Once         05/30/22 1618 05/30/22 1619  Prepare RBC, 2 Units  Blood - Once         05/30/22 1618 05/30/22 1619  Prepare Platelet Pheresis, 2 Units  Blood - Once,   Status:  Canceled         05/30/22 1618 05/30/22 1619  XR Chest PA & Lateral  1 Time Imaging         05/30/22 1618 05/30/22 1619  Carotid Duplex - Bilateral  Once,   Status:  Canceled         05/30/22 1618 05/30/22 1619  Diet Cardiac; Healthy Heart  Diet Effective Now,   Status:  Canceled         05/30/22 1618 05/30/22 1619  Intake & Output  Every Shift,   Status:  Canceled       05/30/22 1618 05/30/22 1619  Oxygen Therapy- Nasal Cannula; Titrate for SPO2: 90% - 95%  Continuous,   Status:  Canceled         05/30/22 1618 05/30/22 1619  Insert Peripheral IV  Once,   Status:  Canceled         05/30/22 1618 05/30/22 1619  Saline Lock & Maintain IV Access  Continuous,   Status:  Canceled         05/30/22 1618 05/30/22 1619  Code Status and Medical Interventions:  Continuous         05/30/22 1618 05/30/22 1619  Place Sequential Compression Device  Once         05/30/22 1618 05/30/22 1619  Maintain Sequential Compression Device  Continuous,   Status:  Canceled         05/30/22 1618 05/30/22 1619  Telemetry - Maintain IV Access  Continuous,   Status:  Canceled         05/30/22 1618 05/30/22 1619  Continuous Cardiac Monitoring  Continuous,   Status:  Canceled         05/30/22 1618 05/30/22 1619  May Be Off Telemetry for Tests  Continuous,   Status:  Canceled          05/30/22 1618    05/30/22 1619  ACLS Protocol For Life Threatening Dysrhythmias (Unless Code Status Indicates Otherwise)  Continuous,   Status:  Canceled         05/30/22 1618    05/30/22 1619  Notify Provider if ACLS Protocol Activated  Until Discontinued,   Status:  Canceled         05/30/22 1618    05/30/22 1619  Activity - Ad Viktoria  Until Discontinued,   Status:  Canceled         05/30/22 1618    05/30/22 1619  CBC Auto Differential  PROCEDURE ONCE         05/30/22 1618 05/30/22 1618  Telemetry - Pulse Oximetry  Continuous PRN,   Status:  Canceled      Comments: If Patient Develops Unresponsiveness, Acute Dyspnea, Cyanosis or Suspected Hypoxemia Start Continuous Pulse Ox Monitoring, Apply Oxygen & Notify Provider    05/30/22 1618 05/30/22 1618  Oxygen Therapy- Nasal Cannula; Titrate for SPO2: 90% - 95%  Continuous PRN,   Status:  Canceled      Comments: If Patient Develops Unresponsiveness, Acute Dyspnea, Cyanosis or Suspected Hypoxemia Start Continuous Pulse Ox Monitoring, Apply Oxygen & Notify Provider    05/30/22 1618    05/30/22 1618  nitroglycerin (NITROSTAT) SL tablet 0.4 mg  Every 5 Minutes PRN,   Status:  Discontinued         05/30/22 1618    05/30/22 1618  Potassium  As Needed,   Status:  Canceled      Comments: For Ventricular Arrhythmias      05/30/22 1618    05/30/22 1618  Magnesium  As Needed,   Status:  Canceled      Comments: For Ventricular Arrhythmias      05/30/22 1618    05/30/22 1618  Blood Gas, Arterial -  As Needed,   Status:  Canceled      Comments: Per O2 PolicyNotify Physician      05/30/22 1618    05/30/22 1618  acetaminophen (TYLENOL) tablet 650 mg  Every 4 Hours PRN,   Status:  Discontinued         05/30/22 1618    05/30/22 1618  ondansetron (ZOFRAN) injection 4 mg  Every 6 Hours PRN,   Status:  Discontinued         05/30/22 1618    05/30/22 1618  Chlorhexidine Skin Prep - Chin to Toes 12 Hours Prior to Surgery & Morning of Surgery  As Needed,   Status:  Canceled       Comments: Chlorhexidine Skin wipes and instructions for all patients having a procedure requiring an outward incision if not allergic.  If allergic, give antibacterial skin wipes and instructions.  Do not use for facial cases or on any mucus membranes.    12 Hours Prior to Surgery & The Morning of Surgery    05/30/22 1618    05/30/22 1618  sodium chloride 0.9 % flush 10 mL  As Needed,   Status:  Discontinued         05/30/22 1618    05/30/22 1532  COVID PRE-OP / PRE-PROCEDURE SCREENING ORDER (NO ISOLATION) - Swab, Nasopharynx  Once         05/30/22 1532    05/30/22 1532  COVID-19,APTIMA PANTHER(EMMANUEL),[facility]/ CAS, NP/OP SWAB IN UTM/VTM/SALINE TRANSPORT MEDIA,24 HR TAT - Swab, Nasopharynx  PROCEDURE ONCE         05/30/22 1532    05/30/22 1101  Modify Scheduled Case Request  Once,   Status:  Canceled         05/30/22 1101    Unscheduled  ECG 12 Lead  As Needed      Comments: Nurse to Release if Patient Expericences Acute Chest Pain or Dysrhythmias    05/30/22 1618    Unscheduled  Troponin  As Needed      Comments: For Chest Pain      05/30/22 1618    Unscheduled  Check Peripheral Pulses As Needed  As Needed       05/31/22 1107    Unscheduled  Pacemaker Settings - Initiate for Heart Rate Less Than 60 And / Or Hemodynamically Unstable  As Needed      Comments: Mode: AAI  Atrial rate: 90  Atrial mA: 10  Atrial mV: 0.5    05/31/22 1107    Unscheduled  Insert Nasogastric Tube If Indicated & Not Already in Place  As Needed      Comments: Indications: Nausea, Vomiting, Prolonged Intubation or to Administer Medications  Attach to Low Wall Suction if Any Residual    05/31/22 1107    Unscheduled  Cleanse Incision With Normal Saline and Redress With Dry 4x4 Gauze if Incision is Draining  As Needed       05/31/22 1107    Unscheduled  Change Chest Tube Dressings PRN to Keep Dry - Do NOT Reinforce  As Needed       05/31/22 1107    Unscheduled  Up in Chair As Tolerated After Extubation  As Needed       05/31/22 1107    Unscheduled   Oxygen Therapy- Nasal Cannula; 2 LPM; Titrate for SPO2: 92%  Continuous PRN       05/31/22 1107    Unscheduled  Patient May Use Home CPAP / BIPAP As Needed  As Needed       05/31/22 1107    Unscheduled  Blood Gas, Arterial -  As Needed      Comments: 30 Minutes After Ventilator Changes, 30 Minutes After Extubation and PRN      05/31/22 1107    Unscheduled  CBC & Differential  As Needed        Comments: Chest Tube Drainage Greater Than 200mL/hr      05/31/22 1107    Unscheduled  aPTT  As Needed        Comments: Chest Tube Drainage Greater Than 200mL/hr      05/31/22 1107    Unscheduled  Protime-INR  As Needed        Comments: Chest Tube Drainage Greater Than 200mL/hr      05/31/22 1107    Unscheduled  Fibrinogen  As Needed        Comments: Chest Tube Drainage Greater Than 200mL/hr      05/31/22 1107    Unscheduled  Potassium  As Needed      Comments: Four hours after dose given.      05/31/22 1107    Unscheduled  Magnesium  As Needed        Comments: If potassium stays low after replacement      05/31/22 1107    --  SCANNED - TELEMETRY           05/30/22 0000    --  SCANNED - TELEMETRY           05/30/22 0000    --  SCANNED - TELEMETRY           05/30/22 0000    --  SCANNED - TELEMETRY           05/30/22 0000                   OPERATIVE REPORT      Gm Greenberg MD at 05/31/22 0757        Operative Note    Date of Dictation: 05/31/22    Date of Procedure: 05/31/22    Preoperative diagnosis: Severe aortic stenosis    Postoperative diagnosis: Severe aortic stenosis    Procedure: AVR biological prosthesis Magna ease #25    Surgeon: Gm Greenberg MD    Assistants: MIRIAM Bone    Anesthesia: General endotracheal anesthesia and JOSE MANUEL    Findings:  Severe bicuspid aortic valve stenosis. Clear pericardial fluid.    Estimated Blood Loss:  <500ml    Specimens: A: Aortic valve   B. Pericardial fluid        Drains: 2 mediastinal CTs    Description of the procedure:     The patient was placed supine on the  operative table. Anesthesia was given and lines placed. The patient was prepped and draped using the usual sterile technique. A median sternotomy was performed with a scalpel and the layers carried down to the sternum using the electrocautery. The sternum was split in the midline using a vertical oscillating saw. Hemostasis was achieved. 300 units/kg of IV heparin was given to an ACT over 400. A Favaloro retractor was placed and the mediastinum exposed, the pericardium was opened and the edges tacked to the wound. The ascending aorta was less than 45mm  So it was not replaced.  Cannulation sutures were placed in the ascending aorta and right atrium. Small cannulas were placed and aorto right atrial cardiopulmonary bypass was started. Cardioplegia cannulas were placed and then the ascending aorta was clamped. One liter of cold blood cardioplegia was given in an antegrade fashion to achieved diastolic arrest and further doses every 15 minutes thereafter. A transverse aortotomy was performed and the diseased valve exposed. The leaflets were resected and the annulus debrided and then the area irrigated. The annulus was sized to a 25 Magna ease prosthesis that was washed as recommended by the . 2.0 Ethibond pledgeted sutures were placed in a supra-annular fashion circumferentially around the annulus and the sutures passed through the sewing ring. The valve was descended and the knots secured. The aortotomy was closed with a double layer of 4.0 Prolene suture and de-aired before closure. Then the warm dose of cardioplegia was given and then the aortic clamp removed. The heart was paced till regular atrial rhythm resumed. I allowed the heart to eject  and I de-aired the left heart chambers under JOSE MANUEL guidance and once hemodynamics were acceptable, then the CPB was discontinued and the venous and cardioplegia cannulas removed. The matching dose of protamine was given and the aortic cannula removed as well. AV  temporary wires and pleural and mediastinal chest tubes were placed and the wound sprayed with platelet rich plasma. The CPB time was 75 minutes and clamp time 63 minutes. The perioperative JOSE MANUEL showed the valve well seated without significant perivalvular leaks. The sternum was closed with single and double wires and soft tissue in layers of reabsorbable material. The wounds were covered with sterile dressings.       Specimen removed:  A. Aortic Valve  B. Pericardial fluid.    CPB time:  75 min.    Aortic clamp time:  63 min.    Complications:  none           Disposition: Cardiovascular recovery room           Condition: Critical but stable.      Electronically signed by Gm Greenberg MD at 05/31/22 1045          Physician Progress Notes       Indiana Ball APRN at 06/01/22 0702           LOS: 2 days   Patient Care Team:  Reyna Zayas MD as PCP - General (Internal Medicine)    Chief Complaint: post op    Subjective      Vital Signs  Temp:  [95.9 °F (35.5 °C)-99.32 °F (37.4 °C)] 99 °F (37.2 °C)  Heart Rate:  [80-85] 85  Resp:  [14-22] 19  BP: ()/(61-82) 106/76  Arterial Line BP: ()/() 115/63  FiO2 (%):  [40 %] 40 %  Body mass index is 33.41 kg/m².    Intake/Output Summary (Last 24 hours) at 6/1/2022 0702  Last data filed at 6/1/2022 0600  Gross per 24 hour   Intake 3672.54 ml   Output 3960 ml   Net -287.46 ml     No intake/output data recorded.    Chest tube drainage last 8 hours 210        05/30/22  1605 05/30/22  2319 06/01/22  0600   Weight: 74.6 kg (164 lb 8 oz) 74.8 kg (165 lb) 78.2 kg (172 lb 6.4 oz)         Objective    Results Review:        WBC WBC   Date Value Ref Range Status   06/01/2022 11.77 (H) 3.40 - 10.80 10*3/mm3 Final   05/31/2022 16.10 (H) 3.40 - 10.80 10*3/mm3 Final   05/31/2022 16.26 (H) 3.40 - 10.80 10*3/mm3 Final   05/30/2022 7.27 3.40 - 10.80 10*3/mm3 Final      HGB Hemoglobin   Date Value Ref Range Status   06/01/2022 11.7 (L) 13.0 - 17.7  g/dL Final   05/31/2022 13.1 13.0 - 17.7 g/dL Final   05/31/2022 11.9 (L) 13.0 - 17.7 g/dL Final   05/31/2022 10.9 (L) 12.0 - 17.0 g/dL Final   05/31/2022 11.2 (L) 12.0 - 17.0 g/dL Final   05/31/2022 10.2 (L) 12.0 - 17.0 g/dL Final   05/31/2022 9.9 (L) 12.0 - 17.0 g/dL Final   05/31/2022 13.6 12.0 - 17.0 g/dL Final   05/30/2022 15.9 13.0 - 17.7 g/dL Final      HCT Hematocrit   Date Value Ref Range Status   06/01/2022 35.4 (L) 37.5 - 51.0 % Final   05/31/2022 37.9 37.5 - 51.0 % Final   05/31/2022 36.1 (L) 37.5 - 51.0 % Final   05/31/2022 32 (L) 38 - 51 % Final   05/31/2022 33 (L) 38 - 51 % Final   05/31/2022 30 (L) 38 - 51 % Final   05/31/2022 29 (L) 38 - 51 % Final   05/31/2022 40 38 - 51 % Final   05/30/2022 45.7 37.5 - 51.0 % Final      Platelets Platelets   Date Value Ref Range Status   06/01/2022 152 140 - 450 10*3/mm3 Final   05/31/2022 193 140 - 450 10*3/mm3 Final   05/31/2022 191 140 - 450 10*3/mm3 Final   05/30/2022 343 140 - 450 10*3/mm3 Final        PT/INR:    Protime   Date Value Ref Range Status   06/01/2022 16.1 (H) 11.7 - 14.2 Seconds Final   05/31/2022 18.3 (H) 11.7 - 14.2 Seconds Final   05/30/2022 13.8 11.7 - 14.2 Seconds Final   /  INR   Date Value Ref Range Status   06/01/2022 1.31 (H) 0.90 - 1.10 Final   05/31/2022 1.55 (H) 0.90 - 1.10 Final   05/30/2022 1.07 0.90 - 1.10 Final       Sodium Sodium   Date Value Ref Range Status   06/01/2022 140 136 - 145 mmol/L Final   05/31/2022 141 136 - 145 mmol/L Final   05/31/2022 139 136 - 145 mmol/L Final   05/31/2022 140 136 - 145 mmol/L Final   05/30/2022 141 136 - 145 mmol/L Final      Potassium Potassium   Date Value Ref Range Status   06/01/2022 4.4 3.5 - 5.2 mmol/L Final   05/31/2022 3.9 3.5 - 5.2 mmol/L Final   05/31/2022 4.3 3.5 - 5.2 mmol/L Final   05/31/2022 4.5 3.5 - 5.2 mmol/L Final   05/31/2022 4.4 3.5 - 5.2 mmol/L Final     Comment:     Slight hemolysis detected by analyzer. Results may be affected.   05/30/2022 3.8 3.5 - 5.2 mmol/L Final       Chloride Chloride   Date Value Ref Range Status   06/01/2022 106 98 - 107 mmol/L Final   05/31/2022 108 (H) 98 - 107 mmol/L Final   05/31/2022 105 98 - 107 mmol/L Final   05/31/2022 104 98 - 107 mmol/L Final   05/30/2022 103 98 - 107 mmol/L Final      Bicarbonate CO2   Date Value Ref Range Status   06/01/2022 22.0 22.0 - 29.0 mmol/L Final   05/31/2022 22.5 22.0 - 29.0 mmol/L Final   05/31/2022 22.0 22.0 - 29.0 mmol/L Final   05/31/2022 25.0 22.0 - 29.0 mmol/L Final   05/30/2022 25.0 22.0 - 29.0 mmol/L Final      BUN BUN   Date Value Ref Range Status   06/01/2022 10 6 - 20 mg/dL Final   05/31/2022 8 6 - 20 mg/dL Final   05/31/2022 8 6 - 20 mg/dL Final   05/31/2022 10 6 - 20 mg/dL Final   05/30/2022 12 6 - 20 mg/dL Final      Creatinine Creatinine   Date Value Ref Range Status   06/01/2022 0.78 0.76 - 1.27 mg/dL Final   05/31/2022 0.75 (L) 0.76 - 1.27 mg/dL Final   05/31/2022 0.99 0.76 - 1.27 mg/dL Final   05/31/2022 0.78 0.76 - 1.27 mg/dL Final   05/30/2022 0.89 0.76 - 1.27 mg/dL Final      Calcium Calcium   Date Value Ref Range Status   06/01/2022 8.7 8.6 - 10.5 mg/dL Final   05/31/2022 8.6 8.6 - 10.5 mg/dL Final   05/31/2022 8.9 8.6 - 10.5 mg/dL Final   05/31/2022 8.8 8.6 - 10.5 mg/dL Final   05/30/2022 9.1 8.6 - 10.5 mg/dL Final      Magnesium Magnesium   Date Value Ref Range Status   06/01/2022 2.4 1.6 - 2.6 mg/dL Final   05/31/2022 2.3 1.6 - 2.6 mg/dL Final   05/31/2022 2.2 1.6 - 2.6 mg/dL Final   05/30/2022 2.1 1.6 - 2.6 mg/dL Final          aspirin, 81 mg, Oral, Daily  atorvastatin, 40 mg, Oral, Nightly  ceFAZolin, 2 g, Intravenous, Q8H  chlorhexidine, 15 mL, Mouth/Throat, Q12H  enoxaparin, 40 mg, Subcutaneous, Daily  methadone, 10 mg, Intravenous, Once  metoprolol tartrate, 12.5 mg, Oral, Q12H  mupirocin, , Each Nare, BID  pantoprazole, 40 mg, Oral, QAM  senna-docusate sodium, 2 tablet, Oral, Nightly      clevidipine, 2-32 mg/hr  dexmedetomidine, 0.2-1.5 mcg/kg/hr, Last Rate: Stopped (05/31/22  1622)  DOPamine, 2-20 mcg/kg/min  EPINEPHrine, 0.02-0.2 mcg/kg/min  insulin, 0-100 Units/hr, Last Rate: Stopped (06/01/22 0450)  milrinone, 0.25-0.375 mcg/kg/min  niCARdipine, 5-15 mg/hr  nitroglycerin, 5-200 mcg/min  norepinephrine, 0.02-0.3 mcg/kg/min  phenylephrine, 0.2-2 mcg/kg/min, Last Rate: Stopped (05/31/22 2100)  sodium chloride, 30 mL/hr, Last Rate: 30 mL/hr (05/31/22 1130)            Patient Active Problem List   Diagnosis Code   • Pericardial effusion (noninflammatory) I31.3   • Tooth pain K08.89   • Severe aortic valve stenosis I35.0   • History of tuberculosis Z86.11   • S/P AVR (aortic valve replacement) Z95.2       Assessment & Plan    -Severe aortic stenosis- s/p AVR (tissue)- POD#1 [place]  -post op anemia- expected acute blood loss  -Leukocytosis- probable reactive    Looks good this morning  Up in the chair- hold IS  4L NC-- wean as able  Sinus rhythm under pacemaker rate 70s  Encourage pulmonary toilet-- continue IS--add flutter valve and mucinex  IV diurese  Mobilize  Transfer to stepdown      ABIDA Sebastian  06/01/22  07:02 EDT      Electronically signed by Indiana Ball APRN at 06/01/22 5493

## 2022-06-01 NOTE — PLAN OF CARE
Goal Outcome Evaluation:   Rec'd from CVR; ambulated to room; marcos well. Discussed care via  service; video chat. Total of 10cc CT output since 1600 on this unit. No c/o voiced.

## 2022-06-01 NOTE — ANESTHESIA POSTPROCEDURE EVALUATION
Patient: Jorge Luis Godinez    Procedure Summary     Date: 05/31/22 Room / Location: [address] CARDIOVASCULAR OPERATING ROOM    Anesthesia Start: 0658 Anesthesia Stop: 1110    Procedure: AORTIC VALVE REPLACEMENT, STERNOTOMY, INTRA OP JOSE MANUEL, PRP (N/A Chest) Diagnosis:       Aortic stenosis, severe      (Aortic stenosis, severe [I35.0])    Surgeons: Gm Greenberg MD Provider: Domenico Kimbrough MD    Anesthesia Type: general, Keisha, PAC ASA Status: 4          Anesthesia Type: general, Keisha, PAC    Vitals  Vitals Value Taken Time   BP 94/67 06/01/22 0001   Temp 36.9 °C (98.42 °F) 06/01/22 0019   Pulse 85 06/01/22 0019   Resp 22 05/31/22 2300   SpO2 96 % 06/01/22 0019   Vitals shown include unvalidated device data.        Post Anesthesia Care and Evaluation    Patient location during evaluation: bedside  Pain management: adequate  Airway patency: patent  Anesthetic complications: No anesthetic complications    Cardiovascular status: acceptable  Respiratory status: acceptable  Hydration status: acceptable

## 2022-06-02 ENCOUNTER — APPOINTMENT (OUTPATIENT)
Dept: GENERAL RADIOLOGY | Facility: HOSPITAL | Age: 47
End: 2022-06-02

## 2022-06-02 LAB
ANION GAP SERPL CALCULATED.3IONS-SCNC: 11.1 MMOL/L (ref 5–15)
BH BB BLOOD EXPIRATION DATE: NORMAL
BH BB BLOOD EXPIRATION DATE: NORMAL
BH BB BLOOD TYPE BARCODE: 7300
BH BB BLOOD TYPE BARCODE: 7300
BH BB DISPENSE STATUS: NORMAL
BH BB DISPENSE STATUS: NORMAL
BH BB PRODUCT CODE: NORMAL
BH BB PRODUCT CODE: NORMAL
BH BB UNIT NUMBER: NORMAL
BH BB UNIT NUMBER: NORMAL
BUN SERPL-MCNC: 8 MG/DL (ref 6–20)
BUN/CREAT SERPL: 10.7 (ref 7–25)
CALCIUM SPEC-SCNC: 8.6 MG/DL (ref 8.6–10.5)
CHLORIDE SERPL-SCNC: 99 MMOL/L (ref 98–107)
CO2 SERPL-SCNC: 24.9 MMOL/L (ref 22–29)
CREAT SERPL-MCNC: 0.75 MG/DL (ref 0.76–1.27)
CROSSMATCH INTERPRETATION: NORMAL
CROSSMATCH INTERPRETATION: NORMAL
DEPRECATED RDW RBC AUTO: 40.5 FL (ref 37–54)
EGFRCR SERPLBLD CKD-EPI 2021: 111.3 ML/MIN/1.73
ERYTHROCYTE [DISTWIDTH] IN BLOOD BY AUTOMATED COUNT: 12.1 % (ref 12.3–15.4)
GLUCOSE BLDC GLUCOMTR-MCNC: 104 MG/DL (ref 70–130)
GLUCOSE BLDC GLUCOMTR-MCNC: 117 MG/DL (ref 70–130)
GLUCOSE BLDC GLUCOMTR-MCNC: 125 MG/DL (ref 70–130)
GLUCOSE BLDC GLUCOMTR-MCNC: 140 MG/DL (ref 70–130)
GLUCOSE SERPL-MCNC: 137 MG/DL (ref 65–99)
HCT VFR BLD AUTO: 34.4 % (ref 37.5–51)
HGB BLD-MCNC: 11.8 G/DL (ref 13–17.7)
MCH RBC QN AUTO: 31.5 PG (ref 26.6–33)
MCHC RBC AUTO-ENTMCNC: 34.3 G/DL (ref 31.5–35.7)
MCV RBC AUTO: 91.7 FL (ref 79–97)
PLATELET # BLD AUTO: 150 10*3/MM3 (ref 140–450)
PMV BLD AUTO: 10.5 FL (ref 6–12)
POTASSIUM SERPL-SCNC: 3.9 MMOL/L (ref 3.5–5.2)
QT INTERVAL: 376 MS
RBC # BLD AUTO: 3.75 10*6/MM3 (ref 4.14–5.8)
SODIUM SERPL-SCNC: 135 MMOL/L (ref 136–145)
UNIT  ABO: NORMAL
UNIT  ABO: NORMAL
UNIT  RH: NORMAL
UNIT  RH: NORMAL
WBC NRBC COR # BLD: 13.4 10*3/MM3 (ref 3.4–10.8)

## 2022-06-02 PROCEDURE — 85027 COMPLETE CBC AUTOMATED: CPT | Performed by: NURSE PRACTITIONER

## 2022-06-02 PROCEDURE — 93005 ELECTROCARDIOGRAM TRACING: CPT | Performed by: NURSE PRACTITIONER

## 2022-06-02 PROCEDURE — 71045 X-RAY EXAM CHEST 1 VIEW: CPT

## 2022-06-02 PROCEDURE — 82962 GLUCOSE BLOOD TEST: CPT

## 2022-06-02 PROCEDURE — 25010000002 ENOXAPARIN PER 10 MG: Performed by: NURSE PRACTITIONER

## 2022-06-02 PROCEDURE — 25010000002 CEFAZOLIN PER 500 MG: Performed by: NURSE PRACTITIONER

## 2022-06-02 PROCEDURE — 25010000002 METOCLOPRAMIDE PER 10 MG: Performed by: NURSE PRACTITIONER

## 2022-06-02 PROCEDURE — 93010 ELECTROCARDIOGRAM REPORT: CPT | Performed by: INTERNAL MEDICINE

## 2022-06-02 PROCEDURE — 25010000002 FUROSEMIDE PER 20 MG: Performed by: NURSE PRACTITIONER

## 2022-06-02 PROCEDURE — 80048 BASIC METABOLIC PNL TOTAL CA: CPT | Performed by: NURSE PRACTITIONER

## 2022-06-02 PROCEDURE — 25010000002 ONDANSETRON PER 1 MG: Performed by: NURSE PRACTITIONER

## 2022-06-02 PROCEDURE — 97116 GAIT TRAINING THERAPY: CPT

## 2022-06-02 RX ORDER — METOCLOPRAMIDE HYDROCHLORIDE 5 MG/ML
5 INJECTION INTRAMUSCULAR; INTRAVENOUS EVERY 6 HOURS
Status: COMPLETED | OUTPATIENT
Start: 2022-06-02 | End: 2022-06-02

## 2022-06-02 RX ORDER — FUROSEMIDE 10 MG/ML
20 INJECTION INTRAMUSCULAR; INTRAVENOUS ONCE
Status: COMPLETED | OUTPATIENT
Start: 2022-06-02 | End: 2022-06-02

## 2022-06-02 RX ORDER — POTASSIUM CHLORIDE 750 MG/1
20 TABLET, FILM COATED, EXTENDED RELEASE ORAL ONCE
Status: COMPLETED | OUTPATIENT
Start: 2022-06-02 | End: 2022-06-02

## 2022-06-02 RX ADMIN — MUPIROCIN 1 APPLICATION: 20 OINTMENT TOPICAL at 08:19

## 2022-06-02 RX ADMIN — METOCLOPRAMIDE HYDROCHLORIDE 5 MG: 5 INJECTION INTRAMUSCULAR; INTRAVENOUS at 22:40

## 2022-06-02 RX ADMIN — OXYCODONE 10 MG: 5 TABLET ORAL at 06:57

## 2022-06-02 RX ADMIN — METOPROLOL TARTRATE 12.5 MG: 25 TABLET, FILM COATED ORAL at 08:22

## 2022-06-02 RX ADMIN — ONDANSETRON 4 MG: 2 INJECTION INTRAMUSCULAR; INTRAVENOUS at 11:20

## 2022-06-02 RX ADMIN — ONDANSETRON 4 MG: 2 INJECTION INTRAMUSCULAR; INTRAVENOUS at 08:24

## 2022-06-02 RX ADMIN — GUAIFENESIN 1200 MG: 600 TABLET, EXTENDED RELEASE ORAL at 22:37

## 2022-06-02 RX ADMIN — METOPROLOL TARTRATE 12.5 MG: 25 TABLET, FILM COATED ORAL at 22:38

## 2022-06-02 RX ADMIN — CHLORHEXIDINE GLUCONATE 15 ML: 1.2 SOLUTION ORAL at 22:37

## 2022-06-02 RX ADMIN — POTASSIUM CHLORIDE 20 MEQ: 750 TABLET, EXTENDED RELEASE ORAL at 11:29

## 2022-06-02 RX ADMIN — PANTOPRAZOLE SODIUM 40 MG: 40 TABLET, DELAYED RELEASE ORAL at 06:57

## 2022-06-02 RX ADMIN — GUAIFENESIN 1200 MG: 600 TABLET, EXTENDED RELEASE ORAL at 08:19

## 2022-06-02 RX ADMIN — HYDROCODONE BITARTRATE AND ACETAMINOPHEN 2 TABLET: 5; 325 TABLET ORAL at 22:40

## 2022-06-02 RX ADMIN — MUPIROCIN 1 APPLICATION: 20 OINTMENT TOPICAL at 22:40

## 2022-06-02 RX ADMIN — FUROSEMIDE 20 MG: 10 INJECTION, SOLUTION INTRAMUSCULAR; INTRAVENOUS at 11:29

## 2022-06-02 RX ADMIN — CYCLOBENZAPRINE 10 MG: 10 TABLET, FILM COATED ORAL at 22:29

## 2022-06-02 RX ADMIN — ENOXAPARIN SODIUM 40 MG: 100 INJECTION SUBCUTANEOUS at 17:06

## 2022-06-02 RX ADMIN — METOCLOPRAMIDE HYDROCHLORIDE 5 MG: 5 INJECTION INTRAMUSCULAR; INTRAVENOUS at 11:30

## 2022-06-02 RX ADMIN — METOCLOPRAMIDE HYDROCHLORIDE 5 MG: 5 INJECTION INTRAMUSCULAR; INTRAVENOUS at 17:06

## 2022-06-02 RX ADMIN — ATORVASTATIN CALCIUM 40 MG: 20 TABLET, FILM COATED ORAL at 22:38

## 2022-06-02 RX ADMIN — DOCUSATE SODIUM 50MG AND SENNOSIDES 8.6MG 2 TABLET: 8.6; 5 TABLET, FILM COATED ORAL at 22:37

## 2022-06-02 RX ADMIN — CHLORHEXIDINE GLUCONATE 15 ML: 1.2 SOLUTION ORAL at 08:19

## 2022-06-02 RX ADMIN — ASPIRIN 81 MG: 81 TABLET, COATED ORAL at 08:19

## 2022-06-02 RX ADMIN — ACETAMINOPHEN 325MG 650 MG: 325 TABLET ORAL at 18:00

## 2022-06-02 RX ADMIN — CEFAZOLIN 2 G: 10 INJECTION, POWDER, FOR SOLUTION INTRAVENOUS at 01:52

## 2022-06-02 NOTE — PLAN OF CARE
Goal Outcome Evaluation:  Plan of Care Reviewed With: patient        Progress: improving  Outcome Evaluation: Pt agreeable to PT this AM, limited mobility 2/2 nausea - RN aware. Pt transferred to stand with SBA and ambulated 120ft with CGA and intermittent HHA - 2 person assist for equipment management. Pt requesting return to room 2/2 nausea and agreeable to staying [facility]. Pt with improved steadiness/independence with gait today - no LOB or safety concerns, but continues to be limited by fatigue. Ambulated w/o O2 and noted mild SOA upon return to room - satting 90-91% and recovering quickly with rest - up to 96% before O2 reapplied. Pt will continue to benefit from skilled PT to address functional deficits. Anticipate D/C home with HHPT.

## 2022-06-02 NOTE — PLAN OF CARE
Goal Outcome Evaluation:  Plan of Care Reviewed With: patient           Outcome Evaluation: POD2 from AV tissue replacement. NSR on monitor, 1L NC, blood pressure stable. Wires remain intact. [place], IJ and CT pulled per orders. IV diuresed with good output. Pt c/o nausea throuhgout day with diaphoresis. Treated per mar. Low grade temp last night but none throuhgout day. No c/o pain. Encouraging IS use and activity as tolerated.

## 2022-06-02 NOTE — THERAPY TREATMENT NOTE
Patient Name: Jorge Luis Godinez  : 1975    MRN: [id number]                              Today's Date: 2022       Admit Date: 2022    Visit Dx:     ICD-10-CM ICD-9-CM   1. S/P AVR (aortic valve replacement)  Z95.2 V43.3   2. Aortic stenosis, severe  I35.0 424.1     Patient Active Problem List   Diagnosis   • Pericardial effusion (noninflammatory)   • Tooth pain   • Severe aortic valve stenosis   • History of tuberculosis   • S/P AVR (aortic valve replacement)     Past Medical History:   Diagnosis Date   • Cardiac murmur    • Hypercholesterolemia    • Hypertension    • Pericardial effusion    • Topical med dermatitis      Past Surgical History:   Procedure Laterality Date   • ASCENDING AORTIC ANEURYSM REPAIR W/ MECHANICAL AORTIC VALVE REPLACEMENT N/A 2022    Procedure: AORTIC VALVE REPLACEMENT, STERNOTOMY, INTRA OP JOSE MANUEL, PRP;  Surgeon: Gm Greenberg MD;  Location: [facility] CVOR;  Service: Cardiothoracic;  Laterality: N/A;   • CARDIAC CATHETERIZATION N/A 2022    Procedure: CORONARY ANGIOGRAPHY;  Surgeon: Lena Corcoran MD;  Location: [facility] CATH INVASIVE LOCATION;  Service: Cardiology;  Laterality: N/A;   • MULTIPLE TOOTH EXTRACTIONS  2022   • SHOULDER CLOSED REDUCTION Right       General Information     [facility] Name 22 1326          Physical Therapy Time and Intention    Document Type therapy note (daily note)  -     Mode of Treatment individual therapy;physical therapy  -     Row Name 22 1326          General Information    Patient Profile Reviewed yes  -     Existing Precautions/Restrictions fall;cardiac;sternal  -     Row Name 22 1326          Cognition    Orientation Status (Cognition) oriented x 4  -     Row Name 22 1326          Safety Issues, Functional Mobility    Impairments Affecting Function (Mobility) balance;endurance/activity tolerance;strength  -           User Key  (r) = Recorded By, (t) = Taken By, (c) = Cosigned By     Initials Name Provider Type     Kaci Doshi PT Physical Therapist               Mobility     Row Name 06/02/22 1327          Bed Mobility    Comment, (Bed Mobility) NT - UIC  -     Row Name 06/02/22 1327          Sit-Stand Transfer    Sit-Stand [place] (Transfers) verbal cues;standby assist  -     Assistive Device (Sit-Stand Transfers) other (see comments)  None  -     Row Name 06/02/22 1327          Gait/Stairs (Locomotion)    [place] Level (Gait) contact guard;2 person assist;1 person to manage equipment;verbal cues  -     Assistive Device (Gait) other (see comments)  Intermittent HHA  -     Distance in Feet (Gait) 120ft  -     Deviations/Abnormal Patterns (Gait) stride length decreased;jose decreased;gait speed decreased  -     Bilateral Gait Deviations forward flexed posture  -     [place] Level (Stairs) not tested  -     Comment, (Gait/Stairs) Tolerated gait well, c/o nausea limiting gait distance today  -           User Key  (r) = Recorded By, (t) = Taken By, (c) = Cosigned By    Initials Name Provider Type     Kaci Doshi PT Physical Therapist               Obj/Interventions     Row Name 06/02/22 1328          Motor Skills    Therapeutic Exercise --  10 reps cardiac rehab protocol  -           User Key  (r) = Recorded By, (t) = Taken By, (c) = Cosigned By    Initials Name Provider Type     Kaci Doshi PT Physical Therapist               Goals/Plan    No documentation.                Clinical Impression     [facility] Name 06/02/22 1328          Pain    Pretreatment Pain Rating 0/10 - no pain  -     Posttreatment Pain Rating 0/10 - no pain  -     Row Name 06/02/22 1328          Plan of Care Review    Plan of Care Reviewed With patient  -     Progress improving  -     Outcome Evaluation Pt agreeable to PT this AM, limited mobility 2/2 nausea - RN aware. Pt transferred to stand with SBA and ambulated 120ft with CGA and intermittent HHA - 2 person  assist for equipment management. Pt requesting return to room 2/2 nausea and agreeable to staying [facility]. Pt with improved steadiness/independence with gait today - no LOB or safety concerns, but continues to be limited by fatigue. Ambulated w/o O2 and noted mild SOA upon return to room - satting 90-91% and recovering quickly with rest - up to 96% before O2 reapplied. Pt will continue to benefit from skilled PT to address functional deficits. Anticipate D/C home with HHPT.  -     Row Name 06/02/22 1328          Vital Signs    Pre SpO2 (%) 97  -     O2 Delivery Pre Treatment supplemental O2  -     Intra SpO2 (%) 90  -     O2 Delivery Intra Treatment room air  -     Post SpO2 (%) 96  -     O2 Delivery Post Treatment supplemental O2  -     Row Name 06/02/22 1328          Positioning and Restraints    Pre-Treatment Position sitting in chair/recliner  -     Post Treatment Position chair  -     In Chair sitting;call light within reach;encouraged to call for assist;with family/caregiver;notified [facility]  -           User Key  (r) = Recorded By, (t) = Taken By, (c) = Cosigned By    Initials Name Provider Type     Kaci Doshi, PT Physical Therapist               Outcome Measures     Row Name 06/02/22 1332 06/02/22 0819       How much help from another person do you currently need...    Turning from your back to your side while in flat bed without using bedrails? 3  - 3  -CB    Moving from lying on back to sitting on the side of a flat bed without bedrails? 3  - 3  -CB    Moving to and from a bed to a chair (including a wheelchair)? 3  - 3  -CB    Standing up from a chair using your arms (e.g., wheelchair, bedside chair)? 4  - 3  -CB    Climbing 3-5 steps with a railing? 3  - 3  -CB    To walk in hospital room? 3  - 3  -CB    AM-PAC 6 Clicks Score (PT) 19  - 18  -CB    Highest level of mobility 6 --> Walked 10 steps or more  - 6 --> Walked 10 steps or more  -    Row Name 06/02/22 5052           Functional Assessment    Outcome Measure Options AM-PAC 6 Clicks Basic Mobility (PT)  -           User Key  (r) = Recorded By, (t) = Taken By, (c) = Cosigned By    Initials Name Provider Type    CB Kamla Wall, RN Registered Nurse     Kaci Doshi, PT Physical Therapist                             Physical Therapy Education                 Title: PT OT SLP Therapies (Done)     Topic: Physical Therapy (Done)     Point: Mobility training (Done)     Learning Progress Summary           Patient Acceptance, E,TB,D, VU,NR by  at 6/2/2022 1332    Acceptance, E, DU by  at 6/1/2022 1134                   Point: Home exercise program (Done)     Learning Progress Summary           Patient Acceptance, E,TB,D, VU,NR by  at 6/2/2022 1332                   Point: Body mechanics (Done)     Learning Progress Summary           Patient Acceptance, E,TB,D, VU,NR by  at 6/2/2022 1332    Acceptance, E, DU by  at 6/1/2022 1134                   Point: Precautions (Done)     Learning Progress Summary           Patient Acceptance, E,TB,D, VU,NR by  at 6/2/2022 1332    Acceptance, E, DU by  at 6/1/2022 1134                               User Key     Initials Effective Dates Name Provider Type Discipline     10/25/19 -  Vesna Gardiner, PT Physical Therapist PT     04/08/22 -  Kaci Doshi PT Physical Therapist PT              PT Recommendation and Plan     Plan of Care Reviewed With: patient  Progress: improving  Outcome Evaluation: Pt agreeable to PT this AM, limited mobility 2/2 nausea - RN aware. Pt transferred to stand with SBA and ambulated 120ft with CGA and intermittent HHA - 2 person assist for equipment management. Pt requesting return to room 2/2 nausea and agreeable to staying [facility]. Pt with improved steadiness/independence with gait today - no LOB or safety concerns, but continues to be limited by fatigue. Ambulated w/o O2 and noted mild SOA upon return to room - satting 90-91% and recovering  quickly with rest - up to 96% before O2 reapplied. Pt will continue to benefit from skilled PT to address functional deficits. Anticipate D/C home with HHPT.     Time Calculation:    PT Charges     Row Name 06/02/22 1333             Time Calculation    Start Time 1033  -      Stop Time 1047  -      Time Calculation (min) 14 min  -      PT Received On 06/02/22  -      PT - Next Appointment 06/03/22  -              Time Calculation- PT    Total Timed Code Minutes- PT 14 minute(s)  -              Timed Charges    09293 - PT Therapeutic Exercise Minutes 4  -      57363 - Gait Training Minutes  8  -      42416 - PT Therapeutic Activity Minutes 2  -              Total Minutes    Timed Charges Total Minutes 14  -       Total Minutes 14  -            User Key  (r) = Recorded By, (t) = Taken By, (c) = Cosigned By    Initials Name Provider Type     Kaci Doshi, PT Physical Therapist              Therapy Charges for Today     Code Description Service Date Service Provider Modifiers Qty    81800208212 HC GAIT TRAINING EA 15 MIN 6/2/2022 Kaic Doshi, PT GP 1    [id number]  PT THER SUPP EA 15 MIN 6/2/2022 Kaci Doshi, PT GP 1          PT G-Codes  Outcome Measure Options: AM-PAC 6 Clicks Basic Mobility (PT)  AM-PAC 6 Clicks Score (PT): 19    Kaci Doshi PT  6/2/2022

## 2022-06-02 NOTE — PROGRESS NOTES
LOS: 3 days   Patient Care Team:  Reyna Zayas MD as PCP - General (Internal Medicine)    Chief Complaint: post op    Subjective      Vital Signs  Temp:  [97.6 °F (36.4 °C)-99 °F (37.2 °C)] 98.6 °F (37 °C)  Heart Rate:  [73-86] (P) 86  Resp:  [16-18] 16  BP: (104-126)/(66-80) (P) 118/81  Body mass index is 33 kg/m².    Intake/Output Summary (Last 24 hours) at 6/2/2022 0900  Last data filed at 6/2/2022 0819  Gross per 24 hour   Intake 800 ml   Output 2255 ml   Net -1455 ml     I/O this shift:  In: 0   Out: 100 [Urine:100]    Chest tube drainage last 8 hours 40        05/30/22  2319 06/01/22  0600 06/02/22  0600   Weight: 74.8 kg (165 lb) 78.2 kg (172 lb 6.4 oz) 77.2 kg (170 lb 4.8 oz)         Objective    Results Review:        WBC WBC   Date Value Ref Range Status   06/02/2022 13.40 (H) 3.40 - 10.80 10*3/mm3 Final   06/01/2022 11.77 (H) 3.40 - 10.80 10*3/mm3 Final   05/31/2022 16.10 (H) 3.40 - 10.80 10*3/mm3 Final   05/31/2022 16.26 (H) 3.40 - 10.80 10*3/mm3 Final   05/30/2022 7.27 3.40 - 10.80 10*3/mm3 Final      HGB Hemoglobin   Date Value Ref Range Status   06/02/2022 11.8 (L) 13.0 - 17.7 g/dL Final   06/01/2022 11.7 (L) 13.0 - 17.7 g/dL Final   05/31/2022 13.1 13.0 - 17.7 g/dL Final   05/31/2022 11.9 (L) 13.0 - 17.7 g/dL Final   05/31/2022 10.9 (L) 12.0 - 17.0 g/dL Final   05/31/2022 11.2 (L) 12.0 - 17.0 g/dL Final   05/31/2022 10.2 (L) 12.0 - 17.0 g/dL Final   05/31/2022 9.9 (L) 12.0 - 17.0 g/dL Final   05/31/2022 13.6 12.0 - 17.0 g/dL Final   05/30/2022 15.9 13.0 - 17.7 g/dL Final      HCT Hematocrit   Date Value Ref Range Status   06/02/2022 34.4 (L) 37.5 - 51.0 % Final   06/01/2022 35.4 (L) 37.5 - 51.0 % Final   05/31/2022 37.9 37.5 - 51.0 % Final   05/31/2022 36.1 (L) 37.5 - 51.0 % Final   05/31/2022 32 (L) 38 - 51 % Final   05/31/2022 33 (L) 38 - 51 % Final   05/31/2022 30 (L) 38 - 51 % Final   05/31/2022 29 (L) 38 - 51 % Final   05/31/2022 40 38 - 51 % Final   05/30/2022 45.7 37.5 -  51.0 % Final      Platelets Platelets   Date Value Ref Range Status   06/02/2022 150 140 - 450 10*3/mm3 Final   06/01/2022 152 140 - 450 10*3/mm3 Final   05/31/2022 193 140 - 450 10*3/mm3 Final   05/31/2022 191 140 - 450 10*3/mm3 Final   05/30/2022 343 140 - 450 10*3/mm3 Final        PT/INR:    Protime   Date Value Ref Range Status   06/01/2022 16.1 (H) 11.7 - 14.2 Seconds Final   05/31/2022 18.3 (H) 11.7 - 14.2 Seconds Final   05/30/2022 13.8 11.7 - 14.2 Seconds Final   /  INR   Date Value Ref Range Status   06/01/2022 1.31 (H) 0.90 - 1.10 Final   05/31/2022 1.55 (H) 0.90 - 1.10 Final   05/30/2022 1.07 0.90 - 1.10 Final       Sodium Sodium   Date Value Ref Range Status   06/02/2022 135 (L) 136 - 145 mmol/L Final   06/01/2022 140 136 - 145 mmol/L Final   05/31/2022 141 136 - 145 mmol/L Final   05/31/2022 139 136 - 145 mmol/L Final   05/31/2022 140 136 - 145 mmol/L Final   05/30/2022 141 136 - 145 mmol/L Final      Potassium Potassium   Date Value Ref Range Status   06/02/2022 3.9 3.5 - 5.2 mmol/L Final   06/01/2022 4.4 3.5 - 5.2 mmol/L Final   05/31/2022 3.9 3.5 - 5.2 mmol/L Final   05/31/2022 4.3 3.5 - 5.2 mmol/L Final   05/31/2022 4.5 3.5 - 5.2 mmol/L Final   05/31/2022 4.4 3.5 - 5.2 mmol/L Final     Comment:     Slight hemolysis detected by analyzer. Results may be affected.   05/30/2022 3.8 3.5 - 5.2 mmol/L Final      Chloride Chloride   Date Value Ref Range Status   06/02/2022 99 98 - 107 mmol/L Final   06/01/2022 106 98 - 107 mmol/L Final   05/31/2022 108 (H) 98 - 107 mmol/L Final   05/31/2022 105 98 - 107 mmol/L Final   05/31/2022 104 98 - 107 mmol/L Final   05/30/2022 103 98 - 107 mmol/L Final      Bicarbonate CO2   Date Value Ref Range Status   06/02/2022 24.9 22.0 - 29.0 mmol/L Final   06/01/2022 22.0 22.0 - 29.0 mmol/L Final   05/31/2022 22.5 22.0 - 29.0 mmol/L Final   05/31/2022 22.0 22.0 - 29.0 mmol/L Final   05/31/2022 25.0 22.0 - 29.0 mmol/L Final   05/30/2022 25.0 22.0 - 29.0 mmol/L Final      BUN  BUN   Date Value Ref Range Status   06/02/2022 8 6 - 20 mg/dL Final   06/01/2022 10 6 - 20 mg/dL Final   05/31/2022 8 6 - 20 mg/dL Final   05/31/2022 8 6 - 20 mg/dL Final   05/31/2022 10 6 - 20 mg/dL Final   05/30/2022 12 6 - 20 mg/dL Final      Creatinine Creatinine   Date Value Ref Range Status   06/02/2022 0.75 (L) 0.76 - 1.27 mg/dL Final   06/01/2022 0.78 0.76 - 1.27 mg/dL Final   05/31/2022 0.75 (L) 0.76 - 1.27 mg/dL Final   05/31/2022 0.99 0.76 - 1.27 mg/dL Final   05/31/2022 0.78 0.76 - 1.27 mg/dL Final   05/30/2022 0.89 0.76 - 1.27 mg/dL Final      Calcium Calcium   Date Value Ref Range Status   06/02/2022 8.6 8.6 - 10.5 mg/dL Final   06/01/2022 8.7 8.6 - 10.5 mg/dL Final   05/31/2022 8.6 8.6 - 10.5 mg/dL Final   05/31/2022 8.9 8.6 - 10.5 mg/dL Final   05/31/2022 8.8 8.6 - 10.5 mg/dL Final   05/30/2022 9.1 8.6 - 10.5 mg/dL Final      Magnesium Magnesium   Date Value Ref Range Status   06/01/2022 2.4 1.6 - 2.6 mg/dL Final   05/31/2022 2.3 1.6 - 2.6 mg/dL Final   05/31/2022 2.2 1.6 - 2.6 mg/dL Final   05/30/2022 2.1 1.6 - 2.6 mg/dL Final          aspirin, 81 mg, Oral, Daily  atorvastatin, 40 mg, Oral, Nightly  chlorhexidine, 15 mL, Mouth/Throat, Q12H  enoxaparin, 40 mg, Subcutaneous, Daily  furosemide, 20 mg, Intravenous, Once  guaiFENesin, 1,200 mg, Oral, Q12H  insulin lispro, 0-7 Units, Subcutaneous, TID AC  metoprolol tartrate, 12.5 mg, Oral, Q12H  mupirocin, , Each Nare, BID  pantoprazole, 40 mg, Oral, QAM  potassium chloride, 20 mEq, Oral, Once  senna-docusate sodium, 2 tablet, Oral, Nightly      sodium chloride, 15 mL/hr, Last Rate: 15 mL/hr (06/01/22 0740)            Patient Active Problem List   Diagnosis Code   • Pericardial effusion (noninflammatory) I31.3   • Tooth pain K08.89   • Severe aortic valve stenosis I35.0   • History of tuberculosis Z86.11   • S/P AVR (aortic valve replacement) Z95.2       Assessment & Plan       -Severe aortic stenosis- s/p AVR (tissue)- POD#2 [place]  -post op  anemia- expected acute blood loss  -Leukocytosis- probable reactive     Looks good this morning  Complaints of nausea this morning-- will give a few more doses of reglan   Up in the chair  1L NC-- wean as able  Sinus rhythm rate 80s  Encourage pulmonary toilet-- continue IS/flutter valve and mucinex  IV diurese  Discontinue central line, lepe and chest tubes  Mobilize  Continue routine care    [place] Chavez, ABIDA  06/02/22  09:00 EDT

## 2022-06-03 ENCOUNTER — APPOINTMENT (OUTPATIENT)
Dept: GENERAL RADIOLOGY | Facility: HOSPITAL | Age: 47
End: 2022-06-03

## 2022-06-03 LAB
ANION GAP SERPL CALCULATED.3IONS-SCNC: 10 MMOL/L (ref 5–15)
BUN SERPL-MCNC: 9 MG/DL (ref 6–20)
BUN/CREAT SERPL: 15.8 (ref 7–25)
CALCIUM SPEC-SCNC: 8.6 MG/DL (ref 8.6–10.5)
CHLORIDE SERPL-SCNC: 102 MMOL/L (ref 98–107)
CO2 SERPL-SCNC: 27 MMOL/L (ref 22–29)
CREAT SERPL-MCNC: 0.57 MG/DL (ref 0.76–1.27)
DEPRECATED RDW RBC AUTO: 39.5 FL (ref 37–54)
EGFRCR SERPLBLD CKD-EPI 2021: 120.9 ML/MIN/1.73
ERYTHROCYTE [DISTWIDTH] IN BLOOD BY AUTOMATED COUNT: 11.9 % (ref 12.3–15.4)
GLUCOSE BLDC GLUCOMTR-MCNC: 103 MG/DL (ref 70–130)
GLUCOSE BLDC GLUCOMTR-MCNC: 116 MG/DL (ref 70–130)
GLUCOSE BLDC GLUCOMTR-MCNC: 95 MG/DL (ref 70–130)
GLUCOSE BLDC GLUCOMTR-MCNC: 96 MG/DL (ref 70–130)
GLUCOSE SERPL-MCNC: 107 MG/DL (ref 65–99)
HCT VFR BLD AUTO: 34.7 % (ref 37.5–51)
HGB BLD-MCNC: 11.7 G/DL (ref 13–17.7)
MCH RBC QN AUTO: 30.8 PG (ref 26.6–33)
MCHC RBC AUTO-ENTMCNC: 33.7 G/DL (ref 31.5–35.7)
MCV RBC AUTO: 91.3 FL (ref 79–97)
PLATELET # BLD AUTO: 154 10*3/MM3 (ref 140–450)
PMV BLD AUTO: 10.3 FL (ref 6–12)
POTASSIUM SERPL-SCNC: 3.5 MMOL/L (ref 3.5–5.2)
QT INTERVAL: 410 MS
RBC # BLD AUTO: 3.8 10*6/MM3 (ref 4.14–5.8)
SODIUM SERPL-SCNC: 139 MMOL/L (ref 136–145)
WBC NRBC COR # BLD: 11.16 10*3/MM3 (ref 3.4–10.8)

## 2022-06-03 PROCEDURE — 94799 UNLISTED PULMONARY SVC/PX: CPT

## 2022-06-03 PROCEDURE — 80048 BASIC METABOLIC PNL TOTAL CA: CPT | Performed by: NURSE PRACTITIONER

## 2022-06-03 PROCEDURE — 99252 IP/OBS CONSLTJ NEW/EST SF 35: CPT | Performed by: INTERNAL MEDICINE

## 2022-06-03 PROCEDURE — 82962 GLUCOSE BLOOD TEST: CPT

## 2022-06-03 PROCEDURE — 71046 X-RAY EXAM CHEST 2 VIEWS: CPT

## 2022-06-03 PROCEDURE — 97116 GAIT TRAINING THERAPY: CPT

## 2022-06-03 PROCEDURE — 94762 N-INVAS EAR/PLS OXIMTRY CONT: CPT

## 2022-06-03 PROCEDURE — 85027 COMPLETE CBC AUTOMATED: CPT | Performed by: NURSE PRACTITIONER

## 2022-06-03 PROCEDURE — 25010000002 ENOXAPARIN PER 10 MG: Performed by: NURSE PRACTITIONER

## 2022-06-03 PROCEDURE — 93010 ELECTROCARDIOGRAM REPORT: CPT | Performed by: INTERNAL MEDICINE

## 2022-06-03 PROCEDURE — 93005 ELECTROCARDIOGRAM TRACING: CPT | Performed by: NURSE PRACTITIONER

## 2022-06-03 RX ORDER — HYDROCODONE BITARTRATE AND ACETAMINOPHEN 5; 325 MG/1; MG/1
1 TABLET ORAL EVERY 4 HOURS PRN
Qty: 42 TABLET | Refills: 0 | Status: SHIPPED | OUTPATIENT
Start: 2022-06-03 | End: 2022-06-12

## 2022-06-03 RX ORDER — FUROSEMIDE 40 MG/1
40 TABLET ORAL DAILY
Status: DISCONTINUED | OUTPATIENT
Start: 2022-06-03 | End: 2022-06-05 | Stop reason: HOSPADM

## 2022-06-03 RX ORDER — POTASSIUM CHLORIDE 750 MG/1
20 TABLET, FILM COATED, EXTENDED RELEASE ORAL DAILY
Status: DISCONTINUED | OUTPATIENT
Start: 2022-06-03 | End: 2022-06-05 | Stop reason: HOSPADM

## 2022-06-03 RX ORDER — NITROGLYCERIN 0.4 MG/1
0.4 TABLET SUBLINGUAL
Status: DISCONTINUED | OUTPATIENT
Start: 2022-06-03 | End: 2022-06-05 | Stop reason: HOSPADM

## 2022-06-03 RX ORDER — ATENOLOL 25 MG/1
25 TABLET ORAL
Status: DISCONTINUED | OUTPATIENT
Start: 2022-06-03 | End: 2022-06-05 | Stop reason: HOSPADM

## 2022-06-03 RX ADMIN — ATORVASTATIN CALCIUM 40 MG: 20 TABLET, FILM COATED ORAL at 19:37

## 2022-06-03 RX ADMIN — HYDROCODONE BITARTRATE AND ACETAMINOPHEN 1 TABLET: 5; 325 TABLET ORAL at 21:27

## 2022-06-03 RX ADMIN — METOPROLOL TARTRATE 12.5 MG: 25 TABLET, FILM COATED ORAL at 08:47

## 2022-06-03 RX ADMIN — HYDROCODONE BITARTRATE AND ACETAMINOPHEN 2 TABLET: 5; 325 TABLET ORAL at 15:19

## 2022-06-03 RX ADMIN — PANTOPRAZOLE SODIUM 40 MG: 40 TABLET, DELAYED RELEASE ORAL at 06:29

## 2022-06-03 RX ADMIN — MUPIROCIN 1 APPLICATION: 20 OINTMENT TOPICAL at 08:47

## 2022-06-03 RX ADMIN — MUPIROCIN 1 APPLICATION: 20 OINTMENT TOPICAL at 19:37

## 2022-06-03 RX ADMIN — GUAIFENESIN 1200 MG: 600 TABLET, EXTENDED RELEASE ORAL at 19:37

## 2022-06-03 RX ADMIN — FUROSEMIDE 40 MG: 40 TABLET ORAL at 10:36

## 2022-06-03 RX ADMIN — GUAIFENESIN 1200 MG: 600 TABLET, EXTENDED RELEASE ORAL at 08:47

## 2022-06-03 RX ADMIN — HYDROCODONE BITARTRATE AND ACETAMINOPHEN 1 TABLET: 5; 325 TABLET ORAL at 06:30

## 2022-06-03 RX ADMIN — DOCUSATE SODIUM 50MG AND SENNOSIDES 8.6MG 1 TABLET: 8.6; 5 TABLET, FILM COATED ORAL at 19:36

## 2022-06-03 RX ADMIN — POTASSIUM CHLORIDE 20 MEQ: 750 TABLET, EXTENDED RELEASE ORAL at 10:36

## 2022-06-03 RX ADMIN — ENOXAPARIN SODIUM 40 MG: 100 INJECTION SUBCUTANEOUS at 17:44

## 2022-06-03 RX ADMIN — ATENOLOL 25 MG: 25 TABLET ORAL at 10:36

## 2022-06-03 RX ADMIN — ASPIRIN 81 MG: 81 TABLET, COATED ORAL at 08:47

## 2022-06-03 RX ADMIN — POTASSIUM CHLORIDE 40 MEQ: 750 TABLET, EXTENDED RELEASE ORAL at 06:29

## 2022-06-03 NOTE — DISCHARGE PLACEMENT REQUEST
"Myriam Khoury (46 y.o. Male)             Date of Birth   1975    Social Security Number       Address   [address] [place] [address]    Home Phone   [phone]    MRN   [id number]       [place]   None    Marital Status                               Admission Date   5/30/22    Admission Type   Urgent    Admitting Provider   Gm Greenberg MD    Attending Provider   Gm Greenberg MD    Department, Room/Bed   [facility] CARDIOVASC UNIT, 2225/1       Discharge Date       Discharge Disposition       Discharge Destination                               Attending Provider: Gm Greenberg MD    Allergies: Lisinopril    Isolation: None   Infection: COVID (History) (05/31/22)   Code Status: CPR   Advance Care Planning Activity    Ht: 153 cm (60.24\")   Wt: 75.4 kg (166 lb 3.2 oz)    Admission Cmt: None   Principal Problem: Severe aortic valve stenosis [I35.0]                 Active Insurance as of 5/30/2022     Primary Coverage     Payor Plan Insurance Group Employer/Plan Group    [facility] COMMUNITY PLAN [facility] COMMUNITY PLAN [facility]     Payor Plan Address Payor Plan Phone Number Payor Plan Fax Number Effective Dates    [address]   4/1/2022 - None Entered    [facility] [phone]       Subscriber Name Subscriber Birth Date Member ID       MYRIAM KHOURY 1975 [id number]                 Emergency Contacts      (Rel.) Home Phone Work Phone Mobile Phone    Umm Jo (Spouse) [phone] -- [phone]              "

## 2022-06-03 NOTE — CASE MANAGEMENT/SOCIAL WORK
Continued Stay Note  [facility]     Patient Name: Jorge Luis Godinez  MRN: [id number]  Today's Date: 6/3/2022    Admit Date: 5/30/2022     Discharge Plan     Row Name 06/03/22 1648       Plan    Plan Home w/ [facility]    Plan Comments [facility] accepted and will follow Pt at d/c......SS/CCP    Row Name 06/03/22 1601       Plan    Plan Home with [facility], pending accecptance.    Patient/Family in Agreement with Plan yes    Plan Comments CCP made outbound call to [facility] @ 3:55pm, spoke to Daniela regarding [place] for [place]. Daniela stated they would look at patient's referral.    Row Name 06/03/22 1526       Plan    Plan Home with [facility], pending accecptance.    Patient/Family in Agreement with Plan yes    Plan Comments CCP met with patient and patient’s wife, Umm Jo, [phone] at bedside, introduced self and explained role. Patient and wife confirmed the demographic information on patient’s face sheet is accurate. Patient’s wife translated for patient as he speaks Guatemalan and little English. Patient and his wife state that they live at home together with their 3 daughter’s ages 17, 15, and 6. Patient and wife confirmed patient’s PCP is Reyna Zayas. Patient and wife deny a history of HH or SNF. Patient and wife deny patient owing or using any medical equipment. Patient and wife state that patient has 4 or 5 stairs with handrails on both sides to enter/exit the home. Patient and wife deny any stairs within the home to maneuver and state they live in a trailer that is one level. CCP discussed HH and the need for patient to have HH for 2 weeks after discharge and then return to [facility] for outpatient cardiac therapy. Patient and wife verbalized their understanding, neither had a HH preference, both agreeable to [facility]. CCP made referral to [facility] this date. Patient’s wife agreeable to transport patient home at discharge.               Discharge Codes    No documentation.               Expected  Discharge Date and Time     Expected Discharge Date Expected Discharge Time    Jun 6, 2022             Blessing Juarez RN

## 2022-06-03 NOTE — CONSULTS
Date of Hospital Visit: 22  Encounter Provider: Kodak Fernandez MD  Place of Service: [facility] CARDIOLOGY  Patient Name: Jorge Luis Godinez  :1975  [id number]  Referral Provider: Gm Greenberg,*    Chief complaint: Bicuspid aortic valve status post AVR    History of Present Illness: 46-year-old gentleman had a #25 bioprosthetic valve placed is doing well breathing is well rhythms been stable chest tubes are out is moving bowels are moving in general doing quite well      Past Medical History:   Diagnosis Date   • Cardiac murmur    • Hypercholesterolemia    • Hypertension    • Pericardial effusion    • Topical med dermatitis        Past Surgical History:   Procedure Laterality Date   • ASCENDING AORTIC ANEURYSM REPAIR W/ MECHANICAL AORTIC VALVE REPLACEMENT N/A 2022    Procedure: AORTIC VALVE REPLACEMENT, STERNOTOMY, INTRA OP JOSE MANUEL, PRP;  Surgeon: Gm Greenberg MD;  Location: [facility] CVOR;  Service: Cardiothoracic;  Laterality: N/A;   • CARDIAC CATHETERIZATION N/A 2022    Procedure: CORONARY ANGIOGRAPHY;  Surgeon: Lena Corcoran MD;  Location: [facility] CATH INVASIVE LOCATION;  Service: Cardiology;  Laterality: N/A;   • MULTIPLE TOOTH EXTRACTIONS  2022   • SHOULDER CLOSED REDUCTION Right        Medications Prior to Admission   Medication Sig Dispense Refill Last Dose   • amLODIPine (NORVASC) 10 MG tablet Take 10 mg by mouth Daily.   2022 at Unknown time   • losartan (COZAAR) 100 MG tablet Take 100 mg by mouth Daily.   2022 at Unknown time   • simvastatin (ZOCOR) 40 MG tablet Take 40 mg by mouth Every Night.   2022 at Unknown time       Current Meds  Scheduled Meds:aspirin, 81 mg, Oral, Daily  atenolol, 25 mg, Oral, Q24H  atorvastatin, 40 mg, Oral, Nightly  enoxaparin, 40 mg, Subcutaneous, Daily  furosemide, 40 mg, Oral, Daily  guaiFENesin, 1,200 mg, Oral, Q12H  insulin lispro, 0-7 Units, Subcutaneous, TID AC  mupirocin, , Each  Nare, BID  pantoprazole, 40 mg, Oral, QAM  potassium chloride, 20 mEq, Oral, Daily  senna-docusate sodium, 2 tablet, Oral, Nightly      Continuous Infusions:sodium chloride, 15 mL/hr, Last Rate: 15 mL/hr (06/01/22 0740)      PRN Meds:.•  acetaminophen **OR** acetaminophen **OR** acetaminophen  •  ALPRAZolam  •  bisacodyl  •  bisacodyl  •  cyclobenzaprine  •  dextrose  •  dextrose  •  glucagon (human recombinant)  •  HYDROcodone-acetaminophen  •  ibuprofen  •  ketorolac  •  magnesium hydroxide  •  Morphine **AND** naloxone  •  nitroglycerin  •  ondansetron  •  oxyCODONE  •  polyethylene glycol  •  potassium chloride **OR** potassium chloride  •  potassium chloride **OR** potassium chloride    Allergies as of 05/30/2022 - Reviewed 05/30/2022   Allergen Reaction Noted   • Lisinopril Cough 04/17/2017       Social History     Socioeconomic History   • Marital status:    Tobacco Use   • Smoking status: Never Smoker   • Smokeless tobacco: Never Used   Vaping Use   • Vaping Use: Never used   Substance and Sexual Activity   • Alcohol use: Yes     Comment: WEEKENDS   • Drug use: Never   • Sexual activity: Defer       Family History   Problem Relation Age of Onset   • Stroke Mother    • Heart attack Sister        REVIEW OF SYSTEMS:   ROS was performed and is negative except as outlined in HPI     REVIEW OF SYSTEMS:   CONSTITUTIONAL: No weight loss, fever, chills, weakness or fatigue.   HEENT: Eyes: No visual loss, blurred vision, double vision or yellow sclerae. Ears, Nose, Throat: No hearing loss, sneezing, congestion, runny nose or sore throat.   SKIN: No rash or itching.     RESPIRATORY: No shortness of breath, hemoptysis, cough or sputum.   GASTROINTESTINAL: No anorexia, nausea, vomiting or diarrhea. No abdominal pain, bright red blood per rectum or melena.  GENITOURINARY: No burning on urination, hematuria or increased frequency.  NEUROLOGICAL: No headache, dizziness, syncope, paralysis, ataxia, numbness or  "tingling in the extremities. No change in bowel or bladder control.   MUSCULOSKELETAL: No muscle, back pain, joint pain or stiffness.   HEMATOLOGIC: No anemia, bleeding or bruising.   LYMPHATICS: No enlarged nodes. No history of splenectomy.   PSYCHIATRIC: No history of depression, anxiety, hallucinations.   ENDOCRINOLOGIC: No reports of sweating, cold or heat intolerance. No polyuria or polydipsia.       Objective:   Temp:  [97.6 °F (36.4 °C)-98.7 °F (37.1 °C)] 98.2 °F (36.8 °C)  Heart Rate:  [71-88] 77  Resp:  [16] 16  BP: (101-120)/(72-80) 108/74  Body mass index is 32.2 kg/m².  Flowsheet Rows    Flowsheet Row First Filed Value   Admission Height 153 cm (60.24\") Documented at 05/30/2022 1605   Admission Weight 74.6 kg (164 lb 8 oz) Documented at 05/30/2022 1605        Vitals:    06/03/22 1036   BP:    Pulse: 77   Resp:    Temp:    SpO2:        Head:    Normocephalic, without obvious abnormality, atraumatic   Eyes:            Lids and lashes normal, conjunctivae and sclerae normal, no   icterus, no pallor   Ears:    Ears appear intact with no abnormalities noted   Throat:   No oral lesions, dentition good   Neck:   No adenopathy, supple, trachea midline, no thyromegaly, no   carotid bruit, no JVD   Lungs:     Breath sounds are equal and clear to auscultation    Heart:    Normal S1 and S2, RRR, No M/G/R   Abdomen:     Normal bowel sounds, no masses, no organomegaly, soft        non-tender, non-distended, no guarding   Extremities:   Moves all extremities well, no edema, no cyanosis, no redness   Pulses:   Pulses palpable and equal bilaterally.    Skin:  Psychiatric:   No bleeding, bruising or rash    Awake, alert and oriented x 3, normal mood and affect             I personally viewed and interpreted the patient's EKG/Telemetry data    Assessment:  Active Hospital Problems    Diagnosis  POA   • **Severe aortic valve stenosis [I35.0]  Unknown   • S/P AVR (aortic valve replacement) [Z95.2]  Not Applicable    "   Resolved Hospital Problems   No resolved problems to display.       Plan: Doing well after his AVR I do not have any specific recommendations at this point looks like he may be able to be discharged in a day or so have him follow-up in the have a heart clinic with Dr. Cedillo in about a week after discharge    Kodak Fernandez MD  06/03/22  13:06 EDT.

## 2022-06-03 NOTE — CONSULTS
Patient off unit at time of visit, will attempt to met with patient later today.Provided phase II information along with the contact information for cardiac rehab here at [facility].

## 2022-06-03 NOTE — THERAPY DISCHARGE NOTE
Patient Name: Jorge Luis Godinez  : 1975    MRN: [id number]                              Today's Date: 6/3/2022       Admit Date: 2022    Visit Dx:     ICD-10-CM ICD-9-CM   1. S/P AVR (aortic valve replacement)  Z95.2 V43.3   2. Aortic stenosis, severe  I35.0 424.1     Patient Active Problem List   Diagnosis   • Pericardial effusion (noninflammatory)   • Tooth pain   • Severe aortic valve stenosis   • History of tuberculosis   • S/P AVR (aortic valve replacement)     Past Medical History:   Diagnosis Date   • Cardiac murmur    • Hypercholesterolemia    • Hypertension    • Pericardial effusion    • Topical med dermatitis      Past Surgical History:   Procedure Laterality Date   • ASCENDING AORTIC ANEURYSM REPAIR W/ MECHANICAL AORTIC VALVE REPLACEMENT N/A 2022    Procedure: AORTIC VALVE REPLACEMENT, STERNOTOMY, INTRA OP JOSE MANUEL, PRP;  Surgeon: Gm rGeenberg MD;  Location: [facility] CVOR;  Service: Cardiothoracic;  Laterality: N/A;   • CARDIAC CATHETERIZATION N/A 2022    Procedure: CORONARY ANGIOGRAPHY;  Surgeon: Lena Corcoran MD;  Location: [facility] CATH INVASIVE LOCATION;  Service: Cardiology;  Laterality: N/A;   • MULTIPLE TOOTH EXTRACTIONS  2022   • SHOULDER CLOSED REDUCTION Right       General Information     [facility] Name 22 1259          Physical Therapy Time and Intention    Document Type therapy note (daily note)  -     Mode of Treatment individual therapy;physical therapy  -     Row Name 22 1259          General Information    Patient Profile Reviewed yes  -     Existing Precautions/Restrictions fall;cardiac;sternal  -     Row Name 22 1259          Cognition    Orientation Status (Cognition) oriented x 4  -     Row Name 22 1259          Safety Issues, Functional Mobility    Impairments Affecting Function (Mobility) balance;endurance/activity tolerance;strength  -           User Key  (r) = Recorded By, (t) = Taken By, (c) = Cosigned By     Initials Name Provider Type     Kaci Doshi PT Physical Therapist               Mobility     Row Name 06/03/22 1259          Bed Mobility    Comment, (Bed Mobility) NT- UIC  -     Row Name 06/03/22 1259          Sit-Stand Transfer    Sit-Stand Lucas (Transfers) verbal cues;modified independence  -     Assistive Device (Sit-Stand Transfers) other (see comments)  None  -     Row Name 06/03/22 1259          Gait/Stairs (Locomotion)    Lucas Level (Gait) verbal cues;modified independence  -     Assistive Device (Gait) other (see comments)  None  -     Distance in Feet (Gait) 480ft  -     Lucas Level (Stairs) supervision  -     Handrail Location (Stairs) left side (ascending);right side (descending)  -     Number of Steps (Stairs) 3  -     Ascending Technique (Stairs) step-over-step  -     Descending Technique (Stairs) step-over-step  -     Comment, (Gait/Stairs) Significantly progressed gait distance - no unsteadiness/LOB/SOA/fatigue, did 3 steps with SV and no safety concerns  -           User Key  (r) = Recorded By, (t) = Taken By, (c) = Cosigned By    Initials Name Provider Type     Kaci Doshi PT Physical Therapist               Obj/Interventions     Row Name 06/03/22 1301          Motor Skills    Therapeutic Exercise --  10 reps cardiac rehab protocol  -           User Key  (r) = Recorded By, (t) = Taken By, (c) = Cosigned By    Initials Name Provider Type     Kaci Doshi PT Physical Therapist               Goals/Plan     Row Name 06/03/22 1306          Bed Mobility Goal 1 (PT)    Activity/Assistive Device (Bed Mobility Goal 1, PT) sit to supine;supine to sit  -     Lucas Level/Cues Needed (Bed Mobility Goal 1, PT) standby assist;verbal cues required  -     Time Frame (Bed Mobility Goal 1, PT) 10 days  -     Progress/Outcomes (Bed Mobility Goal 1, PT) goal met  -     Row Name 06/03/22 1306          Transfer Goal 1 (PT)     Activity/Assistive Device (Transfer Goal 1, PT) sit-to-stand/stand-to-sit  -     Callahan Level/Cues Needed (Transfer Goal 1, PT) standby assist;verbal cues required  -     Time Frame (Transfer Goal 1, PT) 10 days  -     Progress/Outcome (Transfer Goal 1, PT) goal met  -     Row Name 06/03/22 1306          Gait Training Goal 1 (PT)    Activity/Assistive Device (Gait Training Goal 1, PT) gait (walking locomotion);improve balance and speed;increase endurance/gait distance;increase energy conservation  -     Distance (Gait Training Goal 1, PT) >400'  -     Time Frame (Gait Training Goal 1, PT) 10 days  -     Progress/Outcome (Gait Training Goal 1, PT) goal met  -     Row Name 06/03/22 1306          Patient Education Goal (PT)    Activity (Patient Education Goal, PT) HEP  -     Callahan/Cues/Accuracy (Memory Goal 2, PT) demonstrates adequately;verbalizes understanding  -     Time Frame (Patient Education Goal, PT) 3 days;short term goal (STG)  -     Progress/Outcome (Patient Education Goal, PT) goal met  -           User Key  (r) = Recorded By, (t) = Taken By, (c) = Cosigned By    Initials Name Provider Type     Kaci Doshi, PT Physical Therapist               Clinical Impression     Row Name 06/03/22 1302          Pain    Pretreatment Pain Rating 0/10 - no pain  -     Posttreatment Pain Rating 0/10 - no pain  -     Row Name 06/03/22 1302          Plan of Care Review    Plan of Care Reviewed With patient  -     Progress improving  -     Outcome Evaluation Pt agreeable to PT this AM and able to significantly progress gait distance. Pt transferred to stand with mod I and ambulated a total of 480ft around unit with mod I-SV and no AD. Pt demonstrated overall steadiness/independence with mobility and did 3 steps with SV and 1 HR use. Pt with no LOB or safety concerns on the steps and noted no SOA following. Pt has met all therapy goals with no further acute PT needs. Encouraged  pt to walk with nsg/family throughout the day as tolerated to continue working on improving strength/endurance. PT recommending D/C home with HHPT. PT will sign-off - RN aware.  -     Row Name 06/03/22 1302          Vital Signs    Pre SpO2 (%) 100  -     O2 Delivery Pre Treatment supplemental O2  -     Intra SpO2 (%) 95  -     O2 Delivery Intra Treatment room air  -     Post SpO2 (%) 96  -     O2 Delivery Post Treatment room air  -     Row Name 06/03/22 1302          Positioning and Restraints    Pre-Treatment Position sitting in chair/recliner  -     Post Treatment Position chair  -     In Chair sitting;call light within reach;encouraged to call for assist;with family/caregiver;notified nsg  -           User Key  (r) = Recorded By, (t) = Taken By, (c) = Cosigned By    Initials Name Provider Type    Kaci Dubose, PT Physical Therapist               Outcome Measures     Row Name 06/03/22 1306 06/03/22 0849       How much help from another person do you currently need...    Turning from your back to your side while in flat bed without using bedrails? 4  - 3  -CB    Moving from lying on back to sitting on the side of a flat bed without bedrails? 4  - 3  -CB    Moving to and from a bed to a chair (including a wheelchair)? 4  - 3  -CB    Standing up from a chair using your arms (e.g., wheelchair, bedside chair)? 4  - 3  -CB    Climbing 3-5 steps with a railing? 4  - 3  -CB    To walk in hospital room? 4  - 3  -CB    AM-PAC 6 Clicks Score (PT) 24  - 18  -    Highest level of mobility 8 --> Walked 250 feet or more  - 6 --> Walked 10 steps or more  -    Row Name 06/03/22 1306          Functional Assessment    Outcome Measure Options AM-PAC 6 Clicks Basic Mobility (PT)  -           User Key  (r) = Recorded By, (t) = Taken By, (c) = Cosigned By    Initials Name Provider Type    Kamla Quach, RN Registered Nurse    Kaci Dubose PT Physical Therapist               Physical Therapy Education                 Title: PT OT SLP Therapies (Done)     Topic: Physical Therapy (Done)     Point: Mobility training (Done)     Learning Progress Summary           Patient Acceptance, TB,E,D, VU by  at 6/3/2022 1306   Family Acceptance, E,TB, VU by  at 6/2/2022 1727      Show all documentation for this point (3)                 Point: Home exercise program (Done)     Learning Progress Summary           Patient Acceptance, TB,E,D, VU by  at 6/3/2022 1306   Family Acceptance, E,TB, VU by  at 6/2/2022 1727      Show all documentation for this point (2)                 Point: Body mechanics (Done)     Learning Progress Summary           Patient Acceptance, TB,E,D, VU by  at 6/3/2022 1306   Family Acceptance, E,TB, VU by  at 6/2/2022 1727      Show all documentation for this point (3)                 Point: Precautions (Done)     Learning Progress Summary           Patient Acceptance, TB,E,D, VU by  at 6/3/2022 1306   Family Acceptance, E,TB, VU by  at 6/2/2022 1727      Show all documentation for this point (3)                             User Key     Initials Effective Dates Name Provider Type Discipline     02/25/21 -  Kamla Wall, RN Registered Nurse Nurse     04/08/22 -  Kaci Doshi PT Physical Therapist PT              PT Recommendation and Plan     Plan of Care Reviewed With: patient  Progress: improving  Outcome Evaluation: Pt agreeable to PT this AM and able to significantly progress gait distance. Pt transferred to stand with mod I and ambulated a total of 480ft around unit with mod I-SV and no AD. Pt demonstrated overall steadiness/independence with mobility and did 3 steps with SV and 1 HR use. Pt with no LOB or safety concerns on the steps and noted no SOA following. Pt has met all therapy goals with no further acute PT needs. Encouraged pt to walk with nsg/family throughout the day as tolerated to continue working on improving strength/endurance. PT  recommending D/C home with HHPT. PT will sign-off - RN aware.     Time Calculation:    PT Charges     Row Name 06/03/22 1307             Time Calculation    Start Time 1012  -      Stop Time 1027  -      Time Calculation (min) 15 min  -      PT Received On 06/03/22  -              Time Calculation- PT    Total Timed Code Minutes- PT 15 minute(s)  -              Timed Charges    77882 - Gait Training Minutes  10  -BH      19493 - PT Therapeutic Activity Minutes 5  -BH              Total Minutes    Timed Charges Total Minutes 15  -       Total Minutes 15  -BH            User Key  (r) = Recorded By, (t) = Taken By, (c) = Cosigned By    Initials Name Provider Type     Kaci Doshi, PT Physical Therapist              Therapy Charges for Today     Code Description Service Date Service Provider Modifiers Qty    37962484713 HC GAIT TRAINING EA 15 MIN 6/2/2022 Kaci Doshi, PT GP 1    27537163495 HC PT THER SUPP EA 15 MIN 6/2/2022 Kaci Doshi, PT GP 1    98508681091 HC GAIT TRAINING EA 15 MIN 6/3/2022 Kaci Doshi, PT GP 1          PT G-Codes  Outcome Measure Options: AM-PAC 6 Clicks Basic Mobility (PT)  AM-PAC 6 Clicks Score (PT): 24    PT Discharge Summary  Anticipated Discharge Disposition (PT): home with home health, home with assist    Kaci Doshi, PT  6/3/2022

## 2022-06-03 NOTE — PROGRESS NOTES
LOS: 4 days   Patient Care Team:  Reyna Zayas MD as PCP - General (Internal Medicine)    Chief Complaint: post op    Subjective      Vital Signs  Temp:  [97.6 °F (36.4 °C)-98.7 °F (37.1 °C)] 98.3 °F (36.8 °C)  Heart Rate:  [71-88] 84  Resp:  [16] 16  BP: (101-121)/(72-80) 120/74  Body mass index is 32.2 kg/m².    Intake/Output Summary (Last 24 hours) at 6/3/2022 0855  Last data filed at 6/3/2022 0801  Gross per 24 hour   Intake 1080 ml   Output 1300 ml   Net -220 ml     I/O this shift:  In: 480 [P.O.:480]  Out: -     Chest tube drainage last 8 hours         06/01/22  0600 06/02/22  0600 06/03/22  0600   Weight: 78.2 kg (172 lb 6.4 oz) 77.2 kg (170 lb 4.8 oz) 75.4 kg (166 lb 3.2 oz)         Objective    Results Review:        WBC WBC   Date Value Ref Range Status   06/03/2022 11.16 (H) 3.40 - 10.80 10*3/mm3 Final   06/02/2022 13.40 (H) 3.40 - 10.80 10*3/mm3 Final   06/01/2022 11.77 (H) 3.40 - 10.80 10*3/mm3 Final   05/31/2022 16.10 (H) 3.40 - 10.80 10*3/mm3 Final   05/31/2022 16.26 (H) 3.40 - 10.80 10*3/mm3 Final      HGB Hemoglobin   Date Value Ref Range Status   06/03/2022 11.7 (L) 13.0 - 17.7 g/dL Final   06/02/2022 11.8 (L) 13.0 - 17.7 g/dL Final   06/01/2022 11.7 (L) 13.0 - 17.7 g/dL Final   05/31/2022 13.1 13.0 - 17.7 g/dL Final   05/31/2022 11.9 (L) 13.0 - 17.7 g/dL Final   05/31/2022 10.9 (L) 12.0 - 17.0 g/dL Final   05/31/2022 11.2 (L) 12.0 - 17.0 g/dL Final      HCT Hematocrit   Date Value Ref Range Status   06/03/2022 34.7 (L) 37.5 - 51.0 % Final   06/02/2022 34.4 (L) 37.5 - 51.0 % Final   06/01/2022 35.4 (L) 37.5 - 51.0 % Final   05/31/2022 37.9 37.5 - 51.0 % Final   05/31/2022 36.1 (L) 37.5 - 51.0 % Final   05/31/2022 32 (L) 38 - 51 % Final   05/31/2022 33 (L) 38 - 51 % Final      Platelets Platelets   Date Value Ref Range Status   06/03/2022 154 140 - 450 10*3/mm3 Final   06/02/2022 150 140 - 450 10*3/mm3 Final   06/01/2022 152 140 - 450 10*3/mm3 Final   05/31/2022 193 140 - 450  10*3/mm3 Final   05/31/2022 191 140 - 450 10*3/mm3 Final        PT/INR:    Protime   Date Value Ref Range Status   06/01/2022 16.1 (H) 11.7 - 14.2 Seconds Final   05/31/2022 18.3 (H) 11.7 - 14.2 Seconds Final   /  INR   Date Value Ref Range Status   06/01/2022 1.31 (H) 0.90 - 1.10 Final   05/31/2022 1.55 (H) 0.90 - 1.10 Final       Sodium Sodium   Date Value Ref Range Status   06/03/2022 139 136 - 145 mmol/L Final   06/02/2022 135 (L) 136 - 145 mmol/L Final   06/01/2022 140 136 - 145 mmol/L Final   05/31/2022 141 136 - 145 mmol/L Final   05/31/2022 139 136 - 145 mmol/L Final      Potassium Potassium   Date Value Ref Range Status   06/03/2022 3.5 3.5 - 5.2 mmol/L Final   06/02/2022 3.9 3.5 - 5.2 mmol/L Final   06/01/2022 4.4 3.5 - 5.2 mmol/L Final   05/31/2022 3.9 3.5 - 5.2 mmol/L Final   05/31/2022 4.3 3.5 - 5.2 mmol/L Final   05/31/2022 4.5 3.5 - 5.2 mmol/L Final      Chloride Chloride   Date Value Ref Range Status   06/03/2022 102 98 - 107 mmol/L Final   06/02/2022 99 98 - 107 mmol/L Final   06/01/2022 106 98 - 107 mmol/L Final   05/31/2022 108 (H) 98 - 107 mmol/L Final   05/31/2022 105 98 - 107 mmol/L Final      Bicarbonate CO2   Date Value Ref Range Status   06/03/2022 27.0 22.0 - 29.0 mmol/L Final   06/02/2022 24.9 22.0 - 29.0 mmol/L Final   06/01/2022 22.0 22.0 - 29.0 mmol/L Final   05/31/2022 22.5 22.0 - 29.0 mmol/L Final   05/31/2022 22.0 22.0 - 29.0 mmol/L Final      BUN BUN   Date Value Ref Range Status   06/03/2022 9 6 - 20 mg/dL Final   06/02/2022 8 6 - 20 mg/dL Final   06/01/2022 10 6 - 20 mg/dL Final   05/31/2022 8 6 - 20 mg/dL Final   05/31/2022 8 6 - 20 mg/dL Final      Creatinine Creatinine   Date Value Ref Range Status   06/03/2022 0.57 (L) 0.76 - 1.27 mg/dL Final   06/02/2022 0.75 (L) 0.76 - 1.27 mg/dL Final   06/01/2022 0.78 0.76 - 1.27 mg/dL Final   05/31/2022 0.75 (L) 0.76 - 1.27 mg/dL Final   05/31/2022 0.99 0.76 - 1.27 mg/dL Final      Calcium Calcium   Date Value Ref Range Status    06/03/2022 8.6 8.6 - 10.5 mg/dL Final   06/02/2022 8.6 8.6 - 10.5 mg/dL Final   06/01/2022 8.7 8.6 - 10.5 mg/dL Final   05/31/2022 8.6 8.6 - 10.5 mg/dL Final   05/31/2022 8.9 8.6 - 10.5 mg/dL Final      Magnesium Magnesium   Date Value Ref Range Status   06/01/2022 2.4 1.6 - 2.6 mg/dL Final   05/31/2022 2.3 1.6 - 2.6 mg/dL Final   05/31/2022 2.2 1.6 - 2.6 mg/dL Final          aspirin, 81 mg, Oral, Daily  atorvastatin, 40 mg, Oral, Nightly  enoxaparin, 40 mg, Subcutaneous, Daily  guaiFENesin, 1,200 mg, Oral, Q12H  insulin lispro, 0-7 Units, Subcutaneous, TID AC  metoprolol tartrate, 12.5 mg, Oral, Q12H  mupirocin, , Each Nare, BID  pantoprazole, 40 mg, Oral, QAM  senna-docusate sodium, 2 tablet, Oral, Nightly      sodium chloride, 15 mL/hr, Last Rate: 15 mL/hr (06/01/22 0740)            Patient Active Problem List   Diagnosis Code   • Pericardial effusion (noninflammatory) I31.3   • Tooth pain K08.89   • Severe aortic valve stenosis I35.0   • History of tuberculosis Z86.11   • S/P AVR (aortic valve replacement) Z95.2       Assessment & Plan    -Severe aortic stenosis- s/p AVR (tissue)- POD#3 [place]  -post op anemia- expected acute blood loss  -Leukocytosis- probable reactive     Looks good this morning  Nausea improved  Had a bowel movement yesterday  Up in the chair  1L NC-- wean as able  Sinus rhythm VT prolonged  Will switch him to atenolol today-- will keep wires one more day  PO diurese  Encourage pulmonary toilet-- continue IS/flutter valve and mucinex  Mobilize  May be able to go home with home health tomorrow pending progress  Will check overnight tonight  Continue routine care    ABIDA Sebastian  06/03/22  08:55 EDT

## 2022-06-03 NOTE — CASE MANAGEMENT/SOCIAL WORK
Discharge Planning Assessment  [facility]     Patient Name: Jorge Luis Godinez  MRN: [id number]  Today's Date: 6/3/2022    Admit Date: 5/30/2022     Discharge Needs Assessment     Row Name 06/03/22 1524       Living Environment    People in Home child(dylan), dependent;spouse    Name(s) of People in Home Umm Jo    Current Living Arrangements home    Primary Care Provided by self    Provides Primary Care For no one    Family Caregiver if Needed spouse    Family Caregiver Names Umm Jo    Quality of Family Relationships helpful;involved;supportive    Able to Return to Prior Arrangements yes       Resource/Environmental Concerns    Resource/Environmental Concerns none    Transportation Concerns none       Transition Planning    Patient/Family Anticipates Transition to home with family    Patient/Family Anticipated Services at Transition home health care    Transportation Anticipated family or friend will provide       Discharge Needs Assessment    Equipment Currently Used at Home none    Concerns to be Addressed no discharge needs identified;denies needs/concerns at this time    Anticipated Changes Related to Illness none    Equipment Needed After Discharge none    Outpatient/Agency/Support Group Needs homecare agency    Discharge Facility/Level of Care Needs home with home health               Discharge Plan     Row Name 06/03/22 1526       Plan    Plan Home with [facility], pending accecptance.    Patient/Family in Agreement with Plan yes    Plan Comments CCP met with patient and patient’s wife, Umm Jo, [phone] at bedside, introduced self and explained role. Patient and wife confirmed the demographic information on patient’s face sheet is accurate. Patient’s wife translated for patient as he speaks Swedish and little English. Patient and his wife state that they live at home together with their 3 daughter’s ages 17, 15, and 6. Patient and wife confirmed patient’s PCP is Reyna Zayas.  Patient and wife deny a history of HH or SNF. Patient and wife deny patient owing or using any medical equipment. Patient and wife state that patient has 4 or 5 stairs with handrails on both sides to enter/exit the home. Patient and wife deny any stairs within the home to maneuver and state they live in a trailer that is one level. CCP discussed HH and the need for patient to have HH for 2 weeks after discharge and then return to [facility] for outpatient cardiac therapy. Patient and wife verbalized their understanding, neither had a HH preference, both agreeable to [facility]. CCP made referral to [facility] this date. Patient’s wife agreeable to transport patient home at discharge.              Continued Care and Services - Admitted Since 5/30/2022    Coordination has not been started for this encounter.       Expected Discharge Date and Time     Expected Discharge Date Expected Discharge Time    Jun 6, 2022          Demographic Summary     Row Name 06/03/22 1523       General Information    Admission Type inpatient    Arrived From home    Reason for Consult discharge planning    Preferred Language Eritrean               Functional Status     Row Name 06/03/22 1524       Functional Status    Usual Activity Tolerance good    Current Activity Tolerance moderate       Functional Status, IADL    Medications independent    Meal Preparation independent    Housekeeping independent    Laundry independent    Shopping independent       Mental Status    General Appearance WDL WDL       Mental Status Summary    Recent Changes in Mental Status/Cognitive Functioning no changes       Employment/    Employment Status unemployed               Psychosocial    No documentation.                Abuse/Neglect    No documentation.                Legal    No documentation.                Substance Abuse    No documentation.                Patient Forms    No documentation.

## 2022-06-03 NOTE — CASE MANAGEMENT/SOCIAL WORK
Continued Stay Note  [facility]     Patient Name: Jorge Luis Godinez  MRN: [id number]  Today's Date: 6/3/2022    Admit Date: 5/30/2022     Discharge Plan     Row Name 06/03/22 1601       Plan    Plan Home with [facility], pending accecptance.    Patient/Family in Agreement with Plan yes    Plan Comments CCP made outbound call to [facility] @ 3:55pm, spoke to Daniela regarding referral for [place] for [place]. Daniela stated they would look at patient's referral.    Row Name 06/03/22 1526       Plan    Plan Home with [facility], pending accecptance.    Patient/Family in Agreement with Plan yes    Plan Comments CCP met with patient and patient’s wife, Umm Jo, [phone] at bedside, introduced self and explained role. Patient and wife confirmed the demographic information on patient’s face sheet is accurate. Patient’s wife translated for patient as he speaks Ukrainian and little English. Patient and his wife state that they live at home together with their 3 daughter’s ages 17, 15, and 6. Patient and wife confirmed patient’s PCP is Reyna Zayas. Patient and wife deny a history of HH or SNF. Patient and wife deny patient owing or using any medical equipment. Patient and wife state that patient has 4 or 5 stairs with handrails on both sides to enter/exit the home. Patient and wife deny any stairs within the home to maneuver and state they live in a trailer that is one level. CCP discussed HH and the need for patient to have HH for 2 weeks after discharge and then return to [facility] for outpatient cardiac therapy. Patient and wife verbalized their understanding, neither had a HH preference, both agreeable to [facility]. CCP made referral to [facility] this date. Patient’s wife agreeable to transport patient home at discharge.               Discharge Codes    No documentation.               Expected Discharge Date and Time     Expected Discharge Date Expected Discharge Time    Jun 6, 2022

## 2022-06-03 NOTE — PLAN OF CARE
Goal Outcome Evaluation:  Plan of Care Reviewed With: patient        Progress: improving  Outcome Evaluation: Pt agreeable to PT this AM and able to significantly progress gait distance. Pt transferred to stand with mod I and ambulated a total of 480ft around unit with mod I-SV and no AD. Pt demonstrated overall steadiness/independence with mobility and did 3 steps with SV and 1 HR use. Pt with no LOB or safety concerns on the steps and noted no SOA following. Pt has met all therapy goals with no further acute PT needs. Encouraged pt to walk with nsg/family throughout the day as tolerated to continue working on improving strength/endurance. PT recommending D/C home with HHPT. PT will sign-off - RN aware.

## 2022-06-03 NOTE — CONSULTS
Met with patient and wife to discuss the benefits of cardiac rehab. Provided phase II information along with the contact information for cardiac rehab here at [facility]. Patient reports living in [facility], believes [facility] are [place] is closest to their home. Requested for referral to be sent to [facility].   Explained if receiving home health would not be able to attend cardiac rehab until finished with home health.

## 2022-06-04 LAB
ANION GAP SERPL CALCULATED.3IONS-SCNC: 11 MMOL/L (ref 5–15)
BACTERIA FLD CULT: NORMAL
BUN SERPL-MCNC: 11 MG/DL (ref 6–20)
BUN/CREAT SERPL: 14.1 (ref 7–25)
CALCIUM SPEC-SCNC: 8.9 MG/DL (ref 8.6–10.5)
CHLORIDE SERPL-SCNC: 101 MMOL/L (ref 98–107)
CO2 SERPL-SCNC: 26 MMOL/L (ref 22–29)
CREAT SERPL-MCNC: 0.78 MG/DL (ref 0.76–1.27)
DEPRECATED RDW RBC AUTO: 41.4 FL (ref 37–54)
EGFRCR SERPLBLD CKD-EPI 2021: 110 ML/MIN/1.73
ERYTHROCYTE [DISTWIDTH] IN BLOOD BY AUTOMATED COUNT: 11.9 % (ref 12.3–15.4)
GLUCOSE BLDC GLUCOMTR-MCNC: 107 MG/DL (ref 70–130)
GLUCOSE BLDC GLUCOMTR-MCNC: 120 MG/DL (ref 70–130)
GLUCOSE BLDC GLUCOMTR-MCNC: 122 MG/DL (ref 70–130)
GLUCOSE BLDC GLUCOMTR-MCNC: 97 MG/DL (ref 70–130)
GLUCOSE SERPL-MCNC: 104 MG/DL (ref 65–99)
GRAM STN SPEC: NORMAL
GRAM STN SPEC: NORMAL
HCT VFR BLD AUTO: 37.1 % (ref 37.5–51)
HGB BLD-MCNC: 12.2 G/DL (ref 13–17.7)
MCH RBC QN AUTO: 31.4 PG (ref 26.6–33)
MCHC RBC AUTO-ENTMCNC: 32.9 G/DL (ref 31.5–35.7)
MCV RBC AUTO: 95.4 FL (ref 79–97)
PLATELET # BLD AUTO: 225 10*3/MM3 (ref 140–450)
PMV BLD AUTO: 9.8 FL (ref 6–12)
POTASSIUM SERPL-SCNC: 4.3 MMOL/L (ref 3.5–5.2)
RBC # BLD AUTO: 3.89 10*6/MM3 (ref 4.14–5.8)
SODIUM SERPL-SCNC: 138 MMOL/L (ref 136–145)
WBC NRBC COR # BLD: 9.1 10*3/MM3 (ref 3.4–10.8)

## 2022-06-04 PROCEDURE — 80048 BASIC METABOLIC PNL TOTAL CA: CPT | Performed by: NURSE PRACTITIONER

## 2022-06-04 PROCEDURE — 94799 UNLISTED PULMONARY SVC/PX: CPT

## 2022-06-04 PROCEDURE — 25010000002 ENOXAPARIN PER 10 MG: Performed by: NURSE PRACTITIONER

## 2022-06-04 PROCEDURE — 85027 COMPLETE CBC AUTOMATED: CPT | Performed by: NURSE PRACTITIONER

## 2022-06-04 PROCEDURE — 82962 GLUCOSE BLOOD TEST: CPT

## 2022-06-04 RX ADMIN — MUPIROCIN 1 APPLICATION: 20 OINTMENT TOPICAL at 09:06

## 2022-06-04 RX ADMIN — ENOXAPARIN SODIUM 40 MG: 100 INJECTION SUBCUTANEOUS at 18:20

## 2022-06-04 RX ADMIN — ASPIRIN 81 MG: 81 TABLET, COATED ORAL at 09:05

## 2022-06-04 RX ADMIN — FUROSEMIDE 40 MG: 40 TABLET ORAL at 09:05

## 2022-06-04 RX ADMIN — CYCLOBENZAPRINE 10 MG: 10 TABLET, FILM COATED ORAL at 09:11

## 2022-06-04 RX ADMIN — MUPIROCIN 1 APPLICATION: 20 OINTMENT TOPICAL at 20:33

## 2022-06-04 RX ADMIN — PANTOPRAZOLE SODIUM 40 MG: 40 TABLET, DELAYED RELEASE ORAL at 09:06

## 2022-06-04 RX ADMIN — GUAIFENESIN 1200 MG: 600 TABLET, EXTENDED RELEASE ORAL at 20:33

## 2022-06-04 RX ADMIN — ATENOLOL 25 MG: 25 TABLET ORAL at 09:05

## 2022-06-04 RX ADMIN — DOCUSATE SODIUM 50MG AND SENNOSIDES 8.6MG 2 TABLET: 8.6; 5 TABLET, FILM COATED ORAL at 20:32

## 2022-06-04 RX ADMIN — GUAIFENESIN 1200 MG: 600 TABLET, EXTENDED RELEASE ORAL at 09:05

## 2022-06-04 RX ADMIN — POTASSIUM CHLORIDE 20 MEQ: 750 TABLET, EXTENDED RELEASE ORAL at 09:05

## 2022-06-04 RX ADMIN — ATORVASTATIN CALCIUM 40 MG: 20 TABLET, FILM COATED ORAL at 20:32

## 2022-06-04 RX ADMIN — HYDROCODONE BITARTRATE AND ACETAMINOPHEN 2 TABLET: 5; 325 TABLET ORAL at 09:11

## 2022-06-04 RX ADMIN — HYDROCODONE BITARTRATE AND ACETAMINOPHEN 2 TABLET: 5; 325 TABLET ORAL at 20:32

## 2022-06-04 NOTE — PROGRESS NOTES
" LOS: 5 days   Patient Care Team:  Reyna Zayas MD as PCP - General (Internal Medicine)    Chief Complaint: post op    Subjective:  Symptoms:  Improved.  No shortness of breath or chest pain.    Diet:  Adequate intake.  No nausea or vomiting.    Activity level: Returning to normal.    Pain:  He reports no pain.        Vital Signs  Temp:  [98.2 °F (36.8 °C)-99.6 °F (37.6 °C)] 98.5 °F (36.9 °C)  Heart Rate:  [70-84] 84  Resp:  [16-18] 18  BP: ()/(62-74) 113/74  Body mass index is 31.89 kg/m².    Intake/Output Summary (Last 24 hours) at 6/4/2022 0920  Last data filed at 6/4/2022 0843  Gross per 24 hour   Intake 922 ml   Output 1399 ml   Net -477 ml     I/O this shift:  In: 120 [P.O.:120]  Out: 299 [Urine:299]          06/02/22  0600 06/03/22  0600 06/04/22  0702   Weight: 77.2 kg (170 lb 4.8 oz) 75.4 kg (166 lb 3.2 oz) 74.7 kg (164 lb 9.6 oz)         Objective:  General Appearance:  Comfortable, well-appearing, in no acute distress and not in pain.    Vital signs: (most recent): Blood pressure 117/81, pulse 74, temperature 98.4 °F (36.9 °C), temperature source Oral, resp. rate 18, height 153 cm (60.24\"), weight 74.7 kg (164 lb 9.6 oz), SpO2 94 %.  Vital signs are normal.  No fever.    Output: Producing urine and minimal stool output.    Lungs:  Normal effort and normal respiratory rate.  Breath sounds clear to auscultation.    Heart: Normal rate.  Regular rhythm.    Abdomen: Abdomen is soft.  Bowel sounds are normal.     Extremities: There is no dependent edema.    Neurological: Patient is alert and oriented to person, place and time.    Skin:  Warm and dry.  (Mid-sternal incision clean, dry, and intact )          Results Review:        WBC WBC   Date Value Ref Range Status   06/04/2022 9.10 3.40 - 10.80 10*3/mm3 Final   06/03/2022 11.16 (H) 3.40 - 10.80 10*3/mm3 Final   06/02/2022 13.40 (H) 3.40 - 10.80 10*3/mm3 Final      HGB Hemoglobin   Date Value Ref Range Status   06/04/2022 12.2 (L) " 13.0 - 17.7 g/dL Final   06/03/2022 11.7 (L) 13.0 - 17.7 g/dL Final   06/02/2022 11.8 (L) 13.0 - 17.7 g/dL Final      HCT Hematocrit   Date Value Ref Range Status   06/04/2022 37.1 (L) 37.5 - 51.0 % Final   06/03/2022 34.7 (L) 37.5 - 51.0 % Final   06/02/2022 34.4 (L) 37.5 - 51.0 % Final      Platelets Platelets   Date Value Ref Range Status   06/04/2022 225 140 - 450 10*3/mm3 Final   06/03/2022 154 140 - 450 10*3/mm3 Final   06/02/2022 150 140 - 450 10*3/mm3 Final        PT/INR:    No results found for: PROTIME/  No results found for: INR    Sodium Sodium   Date Value Ref Range Status   06/04/2022 138 136 - 145 mmol/L Final   06/03/2022 139 136 - 145 mmol/L Final   06/02/2022 135 (L) 136 - 145 mmol/L Final      Potassium Potassium   Date Value Ref Range Status   06/04/2022 4.3 3.5 - 5.2 mmol/L Final   06/03/2022 3.5 3.5 - 5.2 mmol/L Final   06/02/2022 3.9 3.5 - 5.2 mmol/L Final      Chloride Chloride   Date Value Ref Range Status   06/04/2022 101 98 - 107 mmol/L Final   06/03/2022 102 98 - 107 mmol/L Final   06/02/2022 99 98 - 107 mmol/L Final      Bicarbonate CO2   Date Value Ref Range Status   06/04/2022 26.0 22.0 - 29.0 mmol/L Final   06/03/2022 27.0 22.0 - 29.0 mmol/L Final   06/02/2022 24.9 22.0 - 29.0 mmol/L Final      BUN BUN   Date Value Ref Range Status   06/04/2022 11 6 - 20 mg/dL Final   06/03/2022 9 6 - 20 mg/dL Final   06/02/2022 8 6 - 20 mg/dL Final      Creatinine Creatinine   Date Value Ref Range Status   06/04/2022 0.78 0.76 - 1.27 mg/dL Final   06/03/2022 0.57 (L) 0.76 - 1.27 mg/dL Final   06/02/2022 0.75 (L) 0.76 - 1.27 mg/dL Final      Calcium Calcium   Date Value Ref Range Status   06/04/2022 8.9 8.6 - 10.5 mg/dL Final   06/03/2022 8.6 8.6 - 10.5 mg/dL Final   06/02/2022 8.6 8.6 - 10.5 mg/dL Final      Magnesium No results found for: MG       aspirin, 81 mg, Oral, Daily  atenolol, 25 mg, Oral, Q24H  atorvastatin, 40 mg, Oral, Nightly  enoxaparin, 40 mg, Subcutaneous, Daily  furosemide, 40 mg,  Oral, Daily  guaiFENesin, 1,200 mg, Oral, Q12H  insulin lispro, 0-7 Units, Subcutaneous, TID AC  mupirocin, , Each Nare, BID  pantoprazole, 40 mg, Oral, QAM  potassium chloride, 20 mEq, Oral, Daily  senna-docusate sodium, 2 tablet, Oral, Nightly      sodium chloride, 15 mL/hr, Last Rate: 15 mL/hr (06/01/22 0740)            Patient Active Problem List   Diagnosis Code   • Pericardial effusion (noninflammatory) I31.3   • Tooth pain K08.89   • Severe aortic valve stenosis I35.0   • History of tuberculosis Z86.11   • S/P AVR (aortic valve replacement) Z95.2       Assessment & Plan    -Severe aortic stenosis- s/p AVR (tissue) -- [place]  -Post op anemia- expected acute blood loss  -Leukocytosis- probable reactive    POD#4:     Patient looks good this morning, sitting in bedside chair   On RA, passed overnight oximetry   Sinus rhythm -- remove AV wires   PO diurese  Encourage pulmonary toilet-- continue IS/flutter valve and mucinex  Likely discharge home with HH tomorrow   Continue routine care, mobilize    Demetris Prince PA-C  06/04/22  09:20 EDT

## 2022-06-05 ENCOUNTER — READMISSION MANAGEMENT (OUTPATIENT)
Dept: CALL CENTER | Facility: HOSPITAL | Age: 47
End: 2022-06-05

## 2022-06-05 ENCOUNTER — TRANSCRIBE ORDERS (OUTPATIENT)
Dept: HOME HEALTH SERVICES | Facility: HOME HEALTHCARE | Age: 47
End: 2022-06-05

## 2022-06-05 ENCOUNTER — HOME HEALTH ADMISSION (OUTPATIENT)
Dept: HOME HEALTH SERVICES | Facility: HOME HEALTHCARE | Age: 47
End: 2022-06-05

## 2022-06-05 VITALS
SYSTOLIC BLOOD PRESSURE: 119 MMHG | HEART RATE: 69 BPM | RESPIRATION RATE: 18 BRPM | DIASTOLIC BLOOD PRESSURE: 85 MMHG | BODY MASS INDEX: 32.18 KG/M2 | TEMPERATURE: 98.1 F | HEIGHT: 60 IN | OXYGEN SATURATION: 94 % | WEIGHT: 163.9 LBS

## 2022-06-05 DIAGNOSIS — I35.0 SEVERE AORTIC STENOSIS: Primary | ICD-10-CM

## 2022-06-05 LAB
ANION GAP SERPL CALCULATED.3IONS-SCNC: 11 MMOL/L (ref 5–15)
BACTERIA SPEC ANAEROBE CULT: NORMAL
BUN SERPL-MCNC: 12 MG/DL (ref 6–20)
BUN/CREAT SERPL: 15.6 (ref 7–25)
CALCIUM SPEC-SCNC: 9 MG/DL (ref 8.6–10.5)
CHLORIDE SERPL-SCNC: 103 MMOL/L (ref 98–107)
CO2 SERPL-SCNC: 24 MMOL/L (ref 22–29)
CREAT SERPL-MCNC: 0.77 MG/DL (ref 0.76–1.27)
EGFRCR SERPLBLD CKD-EPI 2021: 110.4 ML/MIN/1.73
GLUCOSE BLDC GLUCOMTR-MCNC: 103 MG/DL (ref 70–130)
GLUCOSE BLDC GLUCOMTR-MCNC: 97 MG/DL (ref 70–130)
GLUCOSE SERPL-MCNC: 103 MG/DL (ref 65–99)
POTASSIUM SERPL-SCNC: 4.2 MMOL/L (ref 3.5–5.2)
SODIUM SERPL-SCNC: 138 MMOL/L (ref 136–145)

## 2022-06-05 PROCEDURE — 94761 N-INVAS EAR/PLS OXIMETRY MLT: CPT

## 2022-06-05 PROCEDURE — 82962 GLUCOSE BLOOD TEST: CPT

## 2022-06-05 PROCEDURE — 80048 BASIC METABOLIC PNL TOTAL CA: CPT | Performed by: NURSE PRACTITIONER

## 2022-06-05 PROCEDURE — 94799 UNLISTED PULMONARY SVC/PX: CPT

## 2022-06-05 RX ORDER — FUROSEMIDE 40 MG/1
40 TABLET ORAL DAILY
Qty: 14 TABLET | Refills: 0 | Status: SHIPPED | OUTPATIENT
Start: 2022-06-06 | End: 2022-07-05

## 2022-06-05 RX ORDER — ATENOLOL 25 MG/1
25 TABLET ORAL
Qty: 30 TABLET | Refills: 2 | Status: SHIPPED | OUTPATIENT
Start: 2022-06-06 | End: 2022-09-04

## 2022-06-05 RX ORDER — ASPIRIN 81 MG/1
81 TABLET ORAL DAILY
Qty: 30 TABLET | Refills: 2 | Status: SHIPPED | OUTPATIENT
Start: 2022-06-06 | End: 2022-09-04

## 2022-06-05 RX ORDER — POTASSIUM CHLORIDE 20 MEQ/1
20 TABLET, EXTENDED RELEASE ORAL DAILY
Qty: 14 TABLET | Refills: 0 | Status: SHIPPED | OUTPATIENT
Start: 2022-06-06 | End: 2022-06-20

## 2022-06-05 RX ORDER — ACETAMINOPHEN 325 MG/1
650 TABLET ORAL EVERY 4 HOURS PRN
Start: 2022-06-05 | End: 2022-06-19

## 2022-06-05 RX ADMIN — PANTOPRAZOLE SODIUM 40 MG: 40 TABLET, DELAYED RELEASE ORAL at 06:43

## 2022-06-05 RX ADMIN — GUAIFENESIN 1200 MG: 600 TABLET, EXTENDED RELEASE ORAL at 08:50

## 2022-06-05 RX ADMIN — HYDROCODONE BITARTRATE AND ACETAMINOPHEN 2 TABLET: 5; 325 TABLET ORAL at 06:43

## 2022-06-05 RX ADMIN — FUROSEMIDE 40 MG: 40 TABLET ORAL at 08:50

## 2022-06-05 RX ADMIN — MUPIROCIN 1 APPLICATION: 20 OINTMENT TOPICAL at 08:50

## 2022-06-05 RX ADMIN — ASPIRIN 81 MG: 81 TABLET, COATED ORAL at 08:50

## 2022-06-05 RX ADMIN — ATENOLOL 25 MG: 25 TABLET ORAL at 08:50

## 2022-06-05 RX ADMIN — POTASSIUM CHLORIDE 20 MEQ: 750 TABLET, EXTENDED RELEASE ORAL at 08:50

## 2022-06-05 NOTE — DISCHARGE SUMMARY
Date of Admission: 5/30/2022  Date of Discharge:  6/5/2022    Discharge Diagnosis:   -Severe aortic stenosis- s/p AVR (tissue)- [place]  -Post op anemia- expected acute blood loss  -Leukocytosis- probable reactive    Presenting Problem/History of Present Illness  S/P AVR (aortic valve replacement) [Z95.2]     Hospital Course  Patient is a 46 y.o. male  With PMH of tuberculosis (treated for 1 year with 2 agents), HTN, and HLD who presented to us with severe aortic stenosis. Patient was last seen in office by Dr. Fallon on 4/26/22, for which he recommended moving forward with aortic valve replacement. Dr. Greenberg saw the patient on 5/30/22 and on 5/31/22 he performed AVR biological prosthesis Magna ease #25/JOSE MANUEL (see op note for more detail). Operation went well and after patient was transferred to CVR where he was gradually de-escalated and then transferred to CVU. POD1, patient diuresed, on 4LNC, and was in sinus rhythm under pacemaker with a rate of 70's. Patient was transferred to CVU. POD2, patient experienced some nausea and was given Reglan. Patient on 1LNC, in SR in the 80's, and was diuresed. Central line, lepe catheter, and chest tubes discontinued. Patient successfully had BM. POD3, nausea improved, patient on 1LNC, patient in SR with MN prolonged. Patient switched to atenolol. Patient diuresed. Overnight oximetry performed and the patient passed. POD4, patient on RA, AV wires removed. POD5, patient was deemed ready for discharge. Overall, patient has done well post-operatively and will be going home with . Will call patient with post-op follow up appointment once placed. Patient will be seen in office in 3-4 weeks. Post-op recovery education was provided to the patient.     Procedures Performed  Procedure(s):  AORTIC VALVE REPLACEMENT, STERNOTOMY, INTRA OP JOSE MANUEL, PRP       Consults:   Consults     No orders found from 5/1/2022 to 5/31/2022.          Pertinent Test Results:    Lab Results    Component Value Date    WBC 9.10 06/04/2022    HGB 12.2 (L) 06/04/2022    HCT 37.1 (L) 06/04/2022    MCV 95.4 06/04/2022     06/04/2022      Lab Results   Component Value Date    GLUCOSE 103 (H) 06/05/2022    CALCIUM 9.0 06/05/2022     06/05/2022    K 4.2 06/05/2022    CO2 24.0 06/05/2022     06/05/2022    BUN 12 06/05/2022    CREATININE 0.77 06/05/2022    BCR 15.6 06/05/2022    ANIONGAP 11.0 06/05/2022     Lab Results   Component Value Date    INR 1.31 (H) 06/01/2022    PROTIME 16.1 (H) 06/01/2022         Condition on Discharge: Stable     Vital Signs  Temp:  [98 °F (36.7 °C)-98.6 °F (37 °C)] 98.1 °F (36.7 °C)  Heart Rate:  [69-83] 69  Resp:  [18] 18  BP: (106-119)/(72-94) 119/85      Discharge Disposition  [place] [facility]    Discharge Medications     Discharge Medications      New Medications      Instructions Start Date   acetaminophen 325 MG tablet  Commonly known as: TYLENOL   650 mg, Oral, Every 4 Hours PRN      Aspirin Low Dose 81 MG EC tablet  Generic drug: aspirin   81 mg, Oral, Daily   Start Date: June 6, 2022     atenolol 25 MG tablet  Commonly known as: TENORMIN   25 mg, Oral, Every 24 Hours Scheduled   Start Date: June 6, 2022     furosemide 40 MG tablet  Commonly known as: LASIX   40 mg, Oral, Daily   Start Date: June 6, 2022     HYDROcodone-acetaminophen 5-325 MG per tablet  Commonly known as: NORCO   1 tablet, Oral, Every 4 Hours PRN      potassium chloride 20 MEQ CR tablet  Commonly known as: K-DUR,KLOR-CON   20 mEq, Oral, Daily   Start Date: June 6, 2022        Continue These Medications      Instructions Start Date   simvastatin 40 MG tablet  Commonly known as: ZOCOR   40 mg, Oral, Nightly         Stop These Medications    amLODIPine 10 MG tablet  Commonly known as: NORVASC     losartan 100 MG tablet  Commonly known as: COZAAR            Discharge Diet: Heart healthy     Activity at Discharge:    1. No driving for 2 weeks and off narcotic pain medications.  2. Shower  daily. Clean incisions with warm water and antibacterial soap only. Do not put any lotion or ointments on incisions.  3. Ambulate for 10 minutes at least 3 times a day.  4. No heavy lifting > 10lbs until seen in office.   5. Take all medications as prescribed.     Follow-up Appointments  Future Appointments   Date Time Provider Department Center   7/5/2022  1:30 PM Indiana Ball APRN [facility]     Additional Instructions for the Follow-ups that You Need to Schedule     Ambulatory Referral to Cardiac Rehab   As directed      Call MD With Problems / Concerns   As directed      Instructions:  Call office at [phone] for any drainage, increased redness, or fever over 100.5    Order Comments: Instructions:  Call office at [phone] for any drainage, increased redness, or fever over 100.5          Discharge Follow-up with PCP   As directed       Currently Documented PCP:    Reyna Zayas MD    PCP Phone Number:    [phone]     Follow Up Details: in 1 week         Discharge Follow-up with Specialty: Cardiac surgery APRN/PA; 1 Month   As directed      Specialty: Cardiac surgery APRN/PA    Follow Up: 1 Month    Follow Up Details: Will call patient with post-op follow up appt -- appt in 3-4 weeks         Discharge Follow-up with Specified Provider: Cardiologist; 1 Month   As directed      To: Cardiologist    Follow Up: 1 Month    Follow Up Details: call for appointment, bring all medication bottles to appointment               Test Results Pending at Discharge: None        Demetris Prince PA-C  06/05/22  17:51 EDT

## 2022-06-05 NOTE — NURSING NOTE
Discharge instructions provided in South Sudanese.Instructions reviewed  translated to pt by wife and son. All limitations reviewed with verbalized understanding. All questions answered. vss awaiting meds from pharmacy.

## 2022-06-05 NOTE — PROGRESS NOTES
Patient is discharging today. Patient is Jamaican speaking but wife and son speak English. Address is correct on face sheet , but please call son's cell / Jorge Luis for visit scheduling -[phone]. Will transcribe home health SN orders . Thank you!

## 2022-06-05 NOTE — OUTREACH NOTE
Prep Survey    Flowsheet Row Responses   [facility] facility patient discharged from? [place]   Is LACE score < 7 ? No   Emergency Room discharge w/ pulse ox? No   Eligibility [facility]   Date of Admission 05/30/22   Date of Discharge 06/05/22   Discharge Disposition Home-Health Care Svc   Discharge diagnosis severe aortic stenosis, AVR biological prosthesis   Does the patient have one of the following disease processes/diagnoses(primary or secondary)? Cardiothoracic surgery   Does the patient have Home health ordered? Yes   What is the Home health agency?  [facility]   Is there a DME ordered? No   General alerts for this patient Cape Verdean SPEAKING   Prep survey completed? Yes          SUZI ZULUAGA - Registered Nurse

## 2022-06-06 ENCOUNTER — HOME CARE VISIT (OUTPATIENT)
Dept: HOME HEALTH SERVICES | Facility: HOME HEALTHCARE | Age: 47
End: 2022-06-06

## 2022-06-06 ENCOUNTER — TRANSITIONAL CARE MANAGEMENT TELEPHONE ENCOUNTER (OUTPATIENT)
Dept: CALL CENTER | Facility: HOSPITAL | Age: 47
End: 2022-06-06

## 2022-06-06 PROCEDURE — G0299 HHS/HOSPICE OF RN EA 15 MIN: HCPCS

## 2022-06-06 NOTE — HOME HEALTH
pt is a 46 yr old male whom lives at home with wife and daughter and he speaks Portuguese but family can translate for him if needed. he has all medications in the home and is recovering from cabg. incision is without s/s of infection. pt is ambulatory with some assist from alley, he only has a sternal incision not leg incision noted . family was inquiring about physical therapy and none is oredered at this time sn will need to follow up on this

## 2022-06-06 NOTE — OUTREACH NOTE
Call Center TCM Note    Flowsheet Row Responses   [facility] patient discharged from? [place]   Does the patient have one of the following disease processes/diagnoses(primary or secondary)? Cardiothoracic surgery   TCM attempt successful? No  [no verbal release but CM notes indicate wife active with care]   Unsuccessful attempts Attempt 1          Roro Miller, RN    6/6/2022, 10:17 EDT

## 2022-06-06 NOTE — CASE MANAGEMENT/SOCIAL WORK
Case Management Discharge Note      Final Note: Pt discharged home, 6/5/22 with [facility] ...........SRS/RN CM         Selected Continued Care - Discharged on 6/5/2022 Admission date: 5/30/2022 - Discharge disposition: Home-Health Care Svc    Destination    No services have been selected for the patient.              Durable Medical Equipment    No services have been selected for the patient.              Dialysis/Infusion    No services have been selected for the patient.              Home Medical Care Coordination complete.    Service Provider Selected Services Address Phone Fax Patient Preferred    [facility]  Home Health Services [address] [address] [phone] --          Therapy    No services have been selected for the patient.              Community Resources    No services have been selected for the patient.              Community & DME    No services have been selected for the patient.                       Final Discharge Disposition Code: 06 - home with home health care

## 2022-06-06 NOTE — OUTREACH NOTE
Call Center TCM Note    Flowsheet Row Responses   [facility] patient discharged from? [place]   Does the patient have one of the following disease processes/diagnoses(primary or secondary)? Cardiothoracic surgery   TCM attempt successful? Yes   Call start time 1626   Call end time 1632   Discharge diagnosis severe aortic stenosis, AVR biological prosthesis   Person spoke with today (if not patient) and relationship nicholas Kang   Meds reviewed with patient/caregiver? Yes   Is the patient having any side effects they believe may be caused by any medication additions or changes? No   Does the patient have all medications related to this admission filled (includes all antibiotics, pain medications, cardiac medications, etc.) Yes   Is the patient taking all medications as directed (includes completed medication regime)? Yes   Comments regarding appointments Informed of need for cardiology f/u   Does the patient have a primary care provider?  Yes   Does the patient have an appointment scheduled with their C/T surgeon? Yes  [CTS 7/5/22]   Comments regarding PCP Declined PCP f/u--will call   Has the patient kept scheduled appointments due by today? N/A   What is the Home health agency?  [facility]--visit completed today    Has home health visited the patient within 72 hours of discharge? Yes   Has all DME been delivered? Yes   Psychosocial issues? No   Did the patient receive a copy of their discharge instructions? Yes   Nursing interventions Reviewed instructions with patient   What is the patient's perception of their health status since discharge? Improving  [Son reports anticipated surgical pain--taking medications as ordered.  Son had no questions today as reports HH had visited earlier and had questions answered.  ]   Nursing interventions Nurse provided patient education  [Routine post op care reviewed]   Is the patient /caregiver able to teach back basic post-op care? Continue use of incentive spirometry at least 1  week post discharge, Lifting as instructed by MD in discharge instructions, Keep incision areas clean, dry and protected, No tub bath, swimming, or hot tub until instructed by MD, Practice 'cough and deep breath'  [Using I.S.  as ordered]   Is the patient/caregiver able to teach back signs and symptoms of incisional infection? Increased redness, swelling or pain at the incisonal site, Increased drainage or bleeding, Incisional warmth, Pus or odor from incision, Fever  [No s/s infection noted--reviewed with son]   Is the patient/caregiver able to teach back steps to recovery at home? Rest and rebuild strength, gradually increase activity   TCM call completed? Yes          Roro Miller RN    6/6/2022, 16:34 EDT

## 2022-06-10 ENCOUNTER — HOME CARE VISIT (OUTPATIENT)
Dept: HOME HEALTH SERVICES | Facility: HOME HEALTHCARE | Age: 47
End: 2022-06-10

## 2022-06-10 PROCEDURE — G0300 HHS/HOSPICE OF LPN EA 15 MIN: HCPCS

## 2022-06-13 VITALS
SYSTOLIC BLOOD PRESSURE: 120 MMHG | DIASTOLIC BLOOD PRESSURE: 70 MMHG | RESPIRATION RATE: 18 BRPM | OXYGEN SATURATION: 94 % | HEART RATE: 82 BPM | TEMPERATURE: 97.1 F

## 2022-06-13 NOTE — HOME HEALTH
CP assessment, mid sternal incision,   abd drain sites, no s/s of infection, patient is afebrile, pt is compliant with weighing daily, pt is also walking outside the [place] in his subdivision twice a day. pt is having a bm daily sometimes it is hard to pass, t/i caregivers to try stool softners or mirlax, caregiver verbalzie understanding,  Patient speaks Kazakh and caregivers speak Kazakh and english.    Next visitL CP assessement, mid line sternal incision, abd drain sites, constipation, daily weighing, pt continues to ambulate

## 2022-06-14 ENCOUNTER — READMISSION MANAGEMENT (OUTPATIENT)
Dept: CALL CENTER | Facility: HOSPITAL | Age: 47
End: 2022-06-14

## 2022-06-14 ENCOUNTER — HOME CARE VISIT (OUTPATIENT)
Dept: HOME HEALTH SERVICES | Facility: HOME HEALTHCARE | Age: 47
End: 2022-06-14

## 2022-06-14 PROCEDURE — G0300 HHS/HOSPICE OF LPN EA 15 MIN: HCPCS

## 2022-06-14 NOTE — OUTREACH NOTE
CT Surgery Week 2 Survey    Flowsheet Row Responses   [facility] patient discharged from? [place]   Does the patient have one of the following disease processes/diagnoses(primary or secondary)? Cardiothoracic surgery   Week 2 attempt successful? No   Unsuccessful attempts Attempt 1          KERVIN OLIVEIRA - Registered Nurse

## 2022-06-15 ENCOUNTER — READMISSION MANAGEMENT (OUTPATIENT)
Dept: CALL CENTER | Facility: HOSPITAL | Age: 47
End: 2022-06-15

## 2022-06-15 VITALS
HEART RATE: 76 BPM | TEMPERATURE: 97.1 F | RESPIRATION RATE: 18 BRPM | SYSTOLIC BLOOD PRESSURE: 110 MMHG | DIASTOLIC BLOOD PRESSURE: 70 MMHG | OXYGEN SATURATION: 99 %

## 2022-06-15 NOTE — OUTREACH NOTE
CT Surgery Week 2 Survey    Flowsheet Row Responses   [facility] patient discharged from? [place]   Does the patient have one of the following disease processes/diagnoses(primary or secondary)? Cardiothoracic surgery   Week 2 attempt successful? Yes   Call start time 1558   Unsuccessful attempts Attempt 2   Call end time 1603   Discharge diagnosis severe aortic stenosis, AVR biological prosthesis   Person spoke with today (if not patient) and relationship Spouse Umm(English speaking)   Meds reviewed with patient/caregiver? Yes   Does the patient have all medications related to this admission filled (includes all antibiotics, pain medications, cardiac medications, etc.) Yes   Is the patient taking all medications as directed (includes completed medication regime)? Yes   Comments regarding appointments Pt saw NP yesterday   Does the patient have a primary care provider?  Yes   Does the patient have an appointment scheduled with their C/T surgeon? Yes   Comments regarding PCP Declined PCP f/u--will call   Has the patient kept scheduled appointments due by today? Yes   What is the Home health agency?  [facility]--visit completed today    Has home health visited the patient within 72 hours of discharge? Yes   Home health comments  visiting tuesday and friday   Psychosocial issues? No   Did the patient receive a copy of their discharge instructions? Yes   Nursing interventions Reviewed instructions with patient   What is the patient's perception of their health status since discharge? Improving   Is the patient/caregiver able to teach back signs and symptoms of incisional infection? Increased redness, swelling or pain at the incisonal site, Increased drainage or bleeding, Incisional warmth, Pus or odor from incision, Fever   If the patient is a current smoker, are they able to teach back resources for cessation? Not a smoker   Is the patient/caregiver able to teach back the hierarchy of who to call/visit for  symptoms/problems? PCP, Specialist, Home health nurse, Urgent Care, ED, 911 Yes   Week 2 call completed? Yes   Wrap up additional comments Spouse reports patient is doing well.  Incision free of s/sx of infection.  Doing well.          UMA DANIEL - Registered Nurse

## 2022-06-15 NOTE — HOME HEALTH
A&OX4, PT SPEAKS VERY LITTLE ENGLISH, WIFE AND DAUGHTER HELPS TRANSLATE, LUNGS AND CLEAR RESP ARE CLEAR AND EVEN NO RESP DISTRESS NOTICE, PT C/O A DRY COUGH, MIN C/O PAIN TO CHEST, MIDLINE STERNAL INSCION IS C/D/I, NO S/S OF INFECTION, T/I PATIENT FAMILY ON S/S OF INFECTION, ABD DRAIN SITES ARE SCAB OVER, THE PATIENT IS WALKING AROUND HIS NEIGHBORHOOD AT LEAST 2 TIMES A DAY..

## 2022-06-17 ENCOUNTER — HOME CARE VISIT (OUTPATIENT)
Dept: HOME HEALTH SERVICES | Facility: HOME HEALTHCARE | Age: 47
End: 2022-06-17

## 2022-06-17 PROCEDURE — G0299 HHS/HOSPICE OF RN EA 15 MIN: HCPCS

## 2022-06-20 VITALS
WEIGHT: 158 LBS | HEART RATE: 54 BPM | OXYGEN SATURATION: 98 % | RESPIRATION RATE: 18 BRPM | SYSTOLIC BLOOD PRESSURE: 114 MMHG | DIASTOLIC BLOOD PRESSURE: 60 MMHG | BODY MASS INDEX: 30.62 KG/M2 | TEMPERATURE: 97.7 F

## 2022-06-20 NOTE — HOME HEALTH
Instructed on importance of daily weight checks, monitor weight every morning with the same amount of cloth, before eating/drinking, maintain a log, report any weight gain to MD as indicated above and signs of fluid retention such as edema, sudden cough and SOA. Patient able to teach back.    Plan: CP assess, med compliance, post surgical care

## 2022-06-21 ENCOUNTER — HOME CARE VISIT (OUTPATIENT)
Dept: HOME HEALTH SERVICES | Facility: HOME HEALTHCARE | Age: 47
End: 2022-06-21

## 2022-06-21 PROCEDURE — G0299 HHS/HOSPICE OF RN EA 15 MIN: HCPCS

## 2022-06-23 ENCOUNTER — READMISSION MANAGEMENT (OUTPATIENT)
Dept: CALL CENTER | Facility: HOSPITAL | Age: 47
End: 2022-06-23

## 2022-06-23 PROCEDURE — G0180 MD CERTIFICATION HHA PATIENT: HCPCS

## 2022-06-23 NOTE — OUTREACH NOTE
CT Surgery Week 3 Survey    Flowsheet Row Responses   [facility] patient discharged from? [place]   Does the patient have one of the following disease processes/diagnoses(primary or secondary)? Cardiothoracic surgery   Week 3 attempt successful? No   Unsuccessful attempts Attempt 1          YULISA Schafer Licensed Nurse

## 2022-06-24 ENCOUNTER — READMISSION MANAGEMENT (OUTPATIENT)
Dept: CALL CENTER | Facility: HOSPITAL | Age: 47
End: 2022-06-24

## 2022-06-24 VITALS
TEMPERATURE: 98 F | HEART RATE: 64 BPM | OXYGEN SATURATION: 98 % | BODY MASS INDEX: 30.81 KG/M2 | DIASTOLIC BLOOD PRESSURE: 58 MMHG | SYSTOLIC BLOOD PRESSURE: 112 MMHG | WEIGHT: 159 LBS | RESPIRATION RATE: 18 BRPM

## 2022-06-24 NOTE — OUTREACH NOTE
CT Surgery Week 3 Survey    Flowsheet Row Responses   [facility] patient discharged from? [place]   Does the patient have one of the following disease processes/diagnoses(primary or secondary)? Cardiothoracic surgery   Week 3 attempt successful? Yes   Call start time 1556   Call end time 1605   General alerts for this patient Persian SPEAKING   Discharge diagnosis severe aortic stenosis, AVR biological prosthesis   If primary language is not English what is the name and relationship or agency of  used?  ID [id number]   [facility] reviewed with patient/caregiver? Yes   Is the patient having any side effects they believe may be caused by any medication additions or changes? No   Does the patient have all medications related to this admission filled (includes all antibiotics, pain medications, cardiac medications, etc.) Yes   Is the patient taking all medications as directed (includes completed medication regime)? Yes   Does the patient have an appointment scheduled with their C/T surgeon? Yes   Has the patient kept scheduled appointments due by today? Yes   What is the Home health agency?  [facility]--visit completed today    Did the patient receive a copy of their discharge instructions? Yes   Nursing interventions Reviewed instructions with patient   What is the patient's perception of their health status since discharge? Improving   Nursing interventions Nurse provided patient education   Is the patient /caregiver able to teach back basic post-op care? Continue use of incentive spirometry at least 1 week post discharge, Keep incision areas clean, dry and protected   Is the patient/caregiver able to teach back signs and symptoms of incisional infection? Increased redness, swelling or pain at the incisonal site, Increased drainage or bleeding, Pus or odor from incision, Fever   Week 3 call completed? Yes          IZZY MOORE - Registered Nurse

## 2022-06-24 NOTE — HOME HEALTH
No s/s of acute distress noted. Patient denies any SOA, afebrile. Pt denies any lightheadness or dizziness. Patient instructed on s/s of cardiopulmonary complications to report to MD-SOA, increase cough and mucus production, increase fatigue, etc. Instructed on healthy heart diet/low sodium. Patient/spouse able to teach back.     Plan: CP assess, med compliance, post surgical care

## 2022-06-28 ENCOUNTER — HOME CARE VISIT (OUTPATIENT)
Dept: HOME HEALTH SERVICES | Facility: HOME HEALTHCARE | Age: 47
End: 2022-06-28

## 2022-06-28 PROCEDURE — G0299 HHS/HOSPICE OF RN EA 15 MIN: HCPCS

## 2022-07-03 VITALS
OXYGEN SATURATION: 98 % | HEART RATE: 48 BPM | SYSTOLIC BLOOD PRESSURE: 108 MMHG | TEMPERATURE: 98.1 F | WEIGHT: 155 LBS | BODY MASS INDEX: 30.03 KG/M2 | DIASTOLIC BLOOD PRESSURE: 62 MMHG | RESPIRATION RATE: 18 BRPM

## 2022-07-04 NOTE — HOME HEALTH
Patinet C/O dizziness at times and feeling tired. Called and left message with Dr. Carson with [facility]inet S/S    Plan: Follow up on MD contact, CP assess, med reg compliance

## 2022-07-05 ENCOUNTER — HOME CARE VISIT (OUTPATIENT)
Dept: HOME HEALTH SERVICES | Facility: HOME HEALTHCARE | Age: 47
End: 2022-07-05

## 2022-07-05 ENCOUNTER — OFFICE VISIT (OUTPATIENT)
Dept: CARDIAC SURGERY | Facility: CLINIC | Age: 47
End: 2022-07-05

## 2022-07-05 VITALS
SYSTOLIC BLOOD PRESSURE: 152 MMHG | RESPIRATION RATE: 20 BRPM | HEART RATE: 53 BPM | OXYGEN SATURATION: 97 % | DIASTOLIC BLOOD PRESSURE: 85 MMHG | WEIGHT: 155 LBS | HEIGHT: 62 IN | BODY MASS INDEX: 28.52 KG/M2 | TEMPERATURE: 98.2 F

## 2022-07-05 DIAGNOSIS — Z95.2 S/P AVR (AORTIC VALVE REPLACEMENT): Primary | ICD-10-CM

## 2022-07-05 PROCEDURE — 99024 POSTOP FOLLOW-UP VISIT: CPT | Performed by: REGISTERED NURSE

## 2022-07-05 PROCEDURE — G0299 HHS/HOSPICE OF RN EA 15 MIN: HCPCS

## 2022-07-05 NOTE — PATIENT INSTRUCTIONS
"Continue lifting restriction of 10 lbs until 6 weeks and 50 lbs until 12 weeks from date of surgery. No excessive jarring motions or twisting motions until 12 weeks from date of surgery.     Fat and Cholesterol Restricted Eating Plan  Getting too much fat and cholesterol in your diet may cause health problems. Choosing the right foods helps keep your fat and cholesterol at normal levels. This can keep you from getting certain diseases.  Your doctor may recommend an eating plan that includes:  Total fat: ______% or less of total calories a day.  Saturated fat: ______% or less of total calories a day.  Cholesterol: less than _________mg a day.  Fiber: ______g a day.  What are tips for following this plan?  Meal planning  At meals, divide your plate into four equal parts:  Fill one-half of your plate with vegetables and green salads.  Fill one-fourth of your plate with whole grains.  Fill one-fourth of your plate with low-fat (lean) protein foods.  Eat fish that is high in omega-3 fats at least two times a week. This includes mackerel, tuna, sardines, and salmon.  Eat foods that are high in fiber, such as whole grains, beans, apples, broccoli, carrots, peas, and barley.  General tips       Work with your doctor to lose weight if you need to.  Avoid:  Foods with added sugar.  Fried foods.  Foods with partially hydrogenated oils.  Limit alcohol intake to no more than 1 drink a day for nonpregnant women and 2 drinks a day for men. One drink equals 12 oz of beer, 5 oz of wine, or 1½ oz of hard liquor.  Reading food labels  Check food labels for:  Trans fats.  Partially hydrogenated oils.  Saturated fat (g) in each serving.  Cholesterol (mg) in each serving.  Fiber (g) in each serving.  Choose foods with healthy fats, such as:  Monounsaturated fats.  Polyunsaturated fats.  Omega-3 fats.  Choose grain products that have whole grains. Look for the word \"whole\" as the first word in the ingredient list.  Cooking  Cook foods " using low-fat methods. These include baking, boiling, grilling, and broiling.  Eat more home-cooked foods. Eat at restaurants and buffets less often.  Avoid cooking using saturated fats, such as butter, cream, palm oil, palm kernel oil, and coconut oil.  Recommended foods       Fruits  All fresh, canned (in natural juice), or frozen fruits.  Vegetables  Fresh or frozen vegetables (raw, steamed, roasted, or grilled). Green salads.  Grains  Whole grains, such as whole wheat or whole grain breads, crackers, cereals, and pasta. Unsweetened oatmeal, bulgur, barley, quinoa, or brown rice. Corn or whole wheat flour tortillas.  Meats and other protein foods  Ground beef (85% or leaner), grass-fed beef, or beef trimmed of fat. Skinless chicken or turkey. Ground chicken or turkey. Pork trimmed of fat. All fish and seafood. Egg whites. Dried beans, peas, or lentils. Unsalted nuts or seeds. Unsalted canned beans. Nut butters without added sugar or oil.  Dairy  Low-fat or nonfat dairy products, such as skim or 1% milk, 2% or reduced-fat cheeses, low-fat and fat-free ricotta or cottage cheese, or plain low-fat and nonfat yogurt.  Fats and oils  Tub margarine without trans fats. Light or reduced-fat mayonnaise and salad dressings. Avocado. Olive, canola, sesame, or safflower oils.  The items listed above may not be a complete list of foods and beverages you can eat. Contact a dietitian for more information.  Foods to avoid  Fruits  Canned fruit in heavy syrup. Fruit in cream or butter sauce. Fried fruit.  Vegetables  Vegetables cooked in cheese, cream, or butter sauce. Fried vegetables.  Grains  White bread. White pasta. White rice. Cornbread. Bagels, pastries, and croissants. Crackers and snack foods that contain trans fat and hydrogenated oils.  Meats and other protein foods  Fatty cuts of meat. Ribs, chicken wings, morales, sausage, bologna, salami, chitterlings, fatback, hot dogs, bratwurst, and packaged lunch meats. Liver and  organ meats. Whole eggs and egg yolks. Chicken and turkey with skin. Fried meat.  Dairy  Whole or 2% milk, cream, half-and-half, and cream cheese. Whole milk cheeses. Whole-fat or sweetened yogurt. Full-fat cheeses. Nondairy creamers and whipped toppings. Processed cheese, cheese spreads, and cheese curds.  Beverages  Alcohol. Sugar-sweetened drinks such as sodas, lemonade, and fruit drinks.  Fats and oils  Butter, stick margarine, lard, shortening, ghee, or morales fat. Coconut, palm kernel, and palm oils.  Sweets and desserts  Corn syrup, sugars, honey, and molasses. Candy. Jam and jelly. Syrup. Sweetened cereals. Cookies, pies, cakes, donuts, muffins, and ice cream.  The items listed above may not be a complete list of foods and beverages you should avoid. Contact a dietitian for more information.  Summary  Choosing the right foods helps keep your fat and cholesterol at normal levels. This can keep you from getting certain diseases.  At meals, fill one-half of your plate with vegetables and green salads.  Eat high-fiber foods, like whole grains, beans, apples, carrots, peas, and barley.  Limit added sugar, saturated fats, alcohol, and fried foods.  This information is not intended to replace advice given to you by your health care provider. Make sure you discuss any questions you have with your health care provider.  Document Revised: 08/21/2019 Document Reviewed: 09/04/2018  [employer] Patient Education © 2020 [employer]

## 2022-07-08 VITALS
TEMPERATURE: 97.9 F | SYSTOLIC BLOOD PRESSURE: 142 MMHG | OXYGEN SATURATION: 98 % | HEART RATE: 56 BPM | DIASTOLIC BLOOD PRESSURE: 88 MMHG | RESPIRATION RATE: 18 BRPM

## 2022-07-12 NOTE — PROGRESS NOTES
"..CARDIOVASCULAR SURGERY FOLLOW-UP PROGRESS NOTE    Chief Complaint: Post-Op Follow-Up      HPI:   Dear Reyna Zayas MD and colleagues:    It was nice to see Jorge Luis Gdoinez in follow up 4 weeks after surgery.  As you know, he is a 46 y.o. male with PMH significant fortuberculosis in the distant past for what he was treated for 1 year with 2 agents, hypercholesterolemia, and aortic stenosis.  Underwent an aortic valve replacement with biological prosthesis Magna ease #25  with Dr. Greenberg on 5/31/2022.  See op report for full details.      His post-operative period was overall uneventful and he progressed well.     Mr. Godinez was discharged home on aspirin, statin, and beta blocker.           /85 (BP Location: Left arm, Patient Position: Sitting, Cuff Size: Adult)   Pulse 53   Temp 98.2 °F (36.8 °C)   Resp 20   Ht 157.5 cm (62\")   Wt 70.3 kg (155 lb)   SpO2 97%   BMI 28.35 kg/m²   Heart:  regular rate and rhythm, S1, S2 normal, no murmur, click, rub or gallop  Lungs:  clear to auscultation bilaterally  Extremities:  no edema  Incision(s):  mid chest healing well, no significant drainage, no dehiscence, no significant erythema    Assessment/Plan:   Mr. Godinez presents to office today for follow-up.  Appears well and has no acute complaints/concerns.  Denies popping/clicking/rubbing in the sternum.  Denies fever/chills & sign/symptoms of infection. Mid-sternal incision is healing nicely with no erythema/drainage/dehiscence present.  Sternum stable on palpation.        He reports following up with PCP and Cardiology.  Today is his last day of home health.  Due to costs he likely will not be attending cardiac rehab.  He is doing well enough, I do not think this will be a hindrance in his recovery.      Keep incisions clean and dry  OK to drive if not taking narcotic pain medicine  OK to begin cardiac rehab  Follow-up as scheduled with cardiology  Follow-up " as scheduled with PCP  Follow-up with CT surgery prn    Continue lifting restriction of 10 lbs until 6 weeks and 50 lbs until 12 weeks from the date of surgery, no excessive jarring motions or twisting motions until 12 weeks from the date of surgery.    Instructions/restrictions verbally reviewed with patient -- verbalized understanding.  Encouraged patient to call office with any questions or concerns.   After visit summary provided to patient.    Thank you for allowing me to participate in the plan of care for your patient.  Best Regards,    Nina Mckeon, ABIDA  07/12/22  10:05 EDT

## 2024-04-17 ENCOUNTER — TRANSCRIBE ORDERS (OUTPATIENT)
Dept: ADMINISTRATIVE | Facility: HOSPITAL | Age: 49
End: 2024-04-17

## 2024-04-17 DIAGNOSIS — R91.1 PULMONARY NODULE: Primary | ICD-10-CM

## 2024-05-08 ENCOUNTER — TRANSCRIBE ORDERS (OUTPATIENT)
Dept: ADMINISTRATIVE | Facility: HOSPITAL | Age: 49
End: 2024-05-08

## 2024-05-08 DIAGNOSIS — R91.1 SOLITARY PULMONARY NODULE: Primary | ICD-10-CM

## 2024-05-12 ENCOUNTER — HOSPITAL ENCOUNTER (OUTPATIENT)
Facility: HOSPITAL | Age: 49
Discharge: HOME | End: 2024-05-12
Admitting: NURSE PRACTITIONER

## 2024-05-12 DIAGNOSIS — R91.1 SOLITARY PULMONARY NODULE: ICD-10-CM

## 2024-05-12 PROCEDURE — 25510000001 IOPAMIDOL PER 1 ML: Performed by: NURSE PRACTITIONER

## 2024-05-12 PROCEDURE — 71275 CT ANGIOGRAPHY CHEST: CPT

## 2024-05-12 RX ADMIN — IOPAMIDOL 85 ML: 755 INJECTION, SOLUTION INTRAVENOUS at 09:54

## 2024-05-15 NOTE — PROGRESS NOTES
Please make patient aware that his CTA of the chest is stable compared to scan in May 2022.  Pulmonary nodules are stable.  Sizing of the aorta is normal.  No adenopathy.

## (undated) DEVICE — Device

## (undated) DEVICE — SOL ISO/ALC RUB 70PCT 4OZ

## (undated) DEVICE — GLIDESHEATH SLENDER STAINLESS STEEL KIT: Brand: GLIDESHEATH SLENDER

## (undated) DEVICE — BIOPATCH™ ANTIMICROBIAL DRESSING WITH CHLORHEXIDINE GLUCONATE IS A HYDROPHILLIC POLYURETHANE ABSORPTIVE FOAM WITH CHLORHEXIDINE GLUCONATE (CHG) WHICH INHIBITS BACTERIAL GROWTH UNDER THE DRESSING. THE DRESSING IS INTENDED TO BE USED TO ABSORB EXUDATE, COVER A WOUND CAUSED BY VASCULAR AND NONVASCULAR PERCUTANEOUS MEDICAL DEVICES DURING SURGERY, AS WELL AS REDUCE LOCAL INFECTION AND COLONIZATION OF MICROORGANISMS.: Brand: BIOPATCH

## (undated) DEVICE — CATH DIAG IMPULSE FR4 5F 100CM

## (undated) DEVICE — CONTAINER,SPECIMEN,OR STERILE,4OZ: Brand: MEDLINE

## (undated) DEVICE — PK ATS CUST W CARDIOTOMY RESEVOIR

## (undated) DEVICE — CATH DIAG IMPULSE FL3.5 5F 100CM

## (undated) DEVICE — PREP SOL POVIDONE/IODINE BT 4OZ

## (undated) DEVICE — DRN WND CH RND FUL/FLUT NO/TROC 3/8IN 28F

## (undated) DEVICE — BG TRANSF W/COUPLER SPK 600ML

## (undated) DEVICE — SUT PROLN 5/0 V5 36IN 8934H

## (undated) DEVICE — SUT SILK 2/0 SH CR8 18IN CR8 C012D

## (undated) DEVICE — ADAPT ANTEGRADE RETRGR

## (undated) DEVICE — SPNG DISECTOR KTNER XRAY COTN 1/4X9/16IN PK/5

## (undated) DEVICE — PK PERFUS CUST W/CARDIOPLEGIA

## (undated) DEVICE — PK HEART OPN 40

## (undated) DEVICE — CANN VESL CORNRY STR W/4MM BALN

## (undated) DEVICE — SYS PERFUS SEP PLATLT W TIPS CUST

## (undated) DEVICE — SUT SILK 0 CT1 CR8 18IN C021D

## (undated) DEVICE — CATH VENT MIV RADL PIG ST TIP 5F 110CM

## (undated) DEVICE — DRP SLUSH WARMR MACH RECTG 66X44IN

## (undated) DEVICE — SUT PROLN 4/0 V5 36IN 8935H

## (undated) DEVICE — PK CATH CARD 40

## (undated) DEVICE — MARKR SKIN W/RULR AND LBL

## (undated) DEVICE — 8 FOOT DISPOSABLE EXTENSION CABLE WITH SAFE CONNECT / ALLIGATOR CLIP

## (undated) DEVICE — SUT SILK 2 SUTUPAK TIE 60IN SA8H 2STRAND

## (undated) DEVICE — GLV SURG BIOGEL LTX PF 7 1/2

## (undated) DEVICE — DRSNG SURESITE WNDW 2.38X2.75

## (undated) DEVICE — KT MANIFLD CARDIAC

## (undated) DEVICE — HEMOCONCENTRATOR PERFUS LPS06

## (undated) DEVICE — ST. SORBAVIEW ULTIMATE IJ SYSTEM A,C: Brand: CENTURION

## (undated) DEVICE — SPNG GZ WOVN 4X4IN 12PLY 10/BX STRL

## (undated) DEVICE — SUT PROLN 3/0 V7 D/A 36IN 8976H

## (undated) DEVICE — SUT PROLN 6/0 RB2 D/A 30IN 8711H

## (undated) DEVICE — ORGANIZER SUT SHELIGH 3T 213013

## (undated) DEVICE — SUT PROLN 4/0 BB D/A 36IN 8581H

## (undated) DEVICE — OPTIFOAM GENTLE SA, POSTOP, 4X12: Brand: MEDLINE

## (undated) DEVICE — CANN VENI DLP SGL/STAGE RT/ANGL MTL/TP 28F

## (undated) DEVICE — SENSR CERBRL O2 PK/2

## (undated) DEVICE — GLV SURG SIGNATURE ESSENTIAL PF LTX SZ7.5

## (undated) DEVICE — ST PERFUS M/

## (undated) DEVICE — DRSNG SURESITE WNDW 4X4.5

## (undated) DEVICE — CANN AORT ROOT DLP VNT/8IN 14G 7F

## (undated) DEVICE — LP VESL MAXI 2.5X1MM RED 2PK

## (undated) DEVICE — GW EMR FIX EXCHG J STD .035 3MM 260CM

## (undated) DEVICE — OASIS DRAIN, SINGLE, INLINE & ATS COMPATIBLE: Brand: OASIS

## (undated) DEVICE — TEMP PACING WIRE: Brand: MYO/WIRE

## (undated) DEVICE — ST TOURNI COMPL A/ 7IN

## (undated) DEVICE — CANN RETRGR STYLET RSCP 15F

## (undated) DEVICE — CLAMP INSERT: Brand: STEALTH® CLAMP INSERT

## (undated) DEVICE — CANN ART EOPA 3D NV W/CONN 20F

## (undated) DEVICE — TBG ART PRESS 60 IN

## (undated) DEVICE — SUT SILK 2/0 TIES 18IN A185H